# Patient Record
Sex: MALE | Race: BLACK OR AFRICAN AMERICAN | URBAN - METROPOLITAN AREA
[De-identification: names, ages, dates, MRNs, and addresses within clinical notes are randomized per-mention and may not be internally consistent; named-entity substitution may affect disease eponyms.]

---

## 2017-02-23 DIAGNOSIS — E66.09 NON MORBID OBESITY DUE TO EXCESS CALORIES: ICD-10-CM

## 2017-02-24 ENCOUNTER — TELEPHONE (OUTPATIENT)
Dept: INTERNAL MEDICINE CLINIC | Age: 42
End: 2017-02-24

## 2017-02-24 RX ORDER — PHENTERMINE HYDROCHLORIDE 37.5 MG/1
37.5 TABLET ORAL
Qty: 30 TAB | Refills: 0 | Status: SHIPPED | OUTPATIENT
Start: 2017-02-24 | End: 2017-02-28 | Stop reason: SDUPTHER

## 2017-02-28 DIAGNOSIS — E66.09 NON MORBID OBESITY DUE TO EXCESS CALORIES: ICD-10-CM

## 2017-02-28 RX ORDER — PHENTERMINE HYDROCHLORIDE 37.5 MG/1
37.5 TABLET ORAL
Qty: 30 TAB | Refills: 0 | Status: SHIPPED | OUTPATIENT
Start: 2017-02-28 | End: 2017-05-02 | Stop reason: SDUPTHER

## 2017-05-02 ENCOUNTER — TELEPHONE (OUTPATIENT)
Dept: INTERNAL MEDICINE CLINIC | Age: 42
End: 2017-05-02

## 2017-05-02 DIAGNOSIS — E66.09 NON MORBID OBESITY DUE TO EXCESS CALORIES: ICD-10-CM

## 2017-05-02 RX ORDER — PHENTERMINE HYDROCHLORIDE 37.5 MG/1
37.5 TABLET ORAL
Qty: 30 TAB | Refills: 0 | Status: SHIPPED | OUTPATIENT
Start: 2017-05-02 | End: 2017-06-20 | Stop reason: SDUPTHER

## 2017-05-02 NOTE — TELEPHONE ENCOUNTER
Requested Prescriptions     Pending Prescriptions Disp Refills    phentermine (ADIPEX-P) 37.5 mg tablet 30 Tab 0     Sig: Take 1 Tab by mouth every morning. Max Daily Amount: 37.5 mg.        Last office visit was  12/29/16  Next office visit is     6/29/17    Please assist.

## 2017-05-02 NOTE — TELEPHONE ENCOUNTER
Requested Prescriptions     Pending Prescriptions Disp Refills    phentermine (ADIPEX-P) 37.5 mg tablet 30 Tab 0     Sig: Take 1 Tab by mouth every morning.  Max Daily Amount: 37.5 mg.

## 2017-06-20 ENCOUNTER — TELEPHONE (OUTPATIENT)
Dept: INTERNAL MEDICINE CLINIC | Age: 42
End: 2017-06-20

## 2017-06-20 ENCOUNTER — HOSPITAL ENCOUNTER (OUTPATIENT)
Dept: LAB | Age: 42
Discharge: HOME | End: 2017-06-20
Payer: COMMERCIAL

## 2017-06-20 DIAGNOSIS — E66.09 NON MORBID OBESITY DUE TO EXCESS CALORIES: ICD-10-CM

## 2017-06-20 DIAGNOSIS — E78.00 HYPERCHOLESTEREMIA: ICD-10-CM

## 2017-06-20 LAB
ALBUMIN SERPL BCP-MCNC: 4.3 G/DL (ref 3.4–5)
ALBUMIN/GLOB SERPL: 1.3 {RATIO} (ref 0.8–1.7)
ALP SERPL-CCNC: 57 U/L (ref 45–117)
ALT SERPL-CCNC: 46 U/L (ref 16–61)
ANION GAP BLD CALC-SCNC: 10 MMOL/L (ref 3–18)
AST SERPL W P-5'-P-CCNC: 28 U/L (ref 15–37)
BILIRUB SERPL-MCNC: 0.2 MG/DL (ref 0.2–1)
BUN SERPL-MCNC: 20 MG/DL (ref 7–18)
BUN/CREAT SERPL: 21 (ref 12–20)
CALCIUM SERPL-MCNC: 8.9 MG/DL (ref 8.5–10.1)
CHLORIDE SERPL-SCNC: 103 MMOL/L (ref 100–108)
CHOLEST SERPL-MCNC: 182 MG/DL
CO2 SERPL-SCNC: 24 MMOL/L (ref 21–32)
CREAT SERPL-MCNC: 0.97 MG/DL (ref 0.6–1.3)
GLOBULIN SER CALC-MCNC: 3.2 G/DL (ref 2–4)
GLUCOSE SERPL-MCNC: 93 MG/DL (ref 74–99)
HDLC SERPL-MCNC: 62 MG/DL (ref 40–60)
HDLC SERPL: 2.9 {RATIO} (ref 0–5)
LDLC SERPL CALC-MCNC: 110.2 MG/DL (ref 0–100)
LIPID PROFILE,FLP: ABNORMAL
POTASSIUM SERPL-SCNC: 4.5 MMOL/L (ref 3.5–5.5)
PROT SERPL-MCNC: 7.5 G/DL (ref 6.4–8.2)
SODIUM SERPL-SCNC: 137 MMOL/L (ref 136–145)
TRIGL SERPL-MCNC: 49 MG/DL (ref ?–150)
VLDLC SERPL CALC-MCNC: 9.8 MG/DL

## 2017-06-20 PROCEDURE — 80053 COMPREHEN METABOLIC PANEL: CPT | Performed by: INTERNAL MEDICINE

## 2017-06-20 PROCEDURE — 36415 COLL VENOUS BLD VENIPUNCTURE: CPT | Performed by: INTERNAL MEDICINE

## 2017-06-20 PROCEDURE — 80061 LIPID PANEL: CPT | Performed by: INTERNAL MEDICINE

## 2017-06-20 RX ORDER — PHENTERMINE HYDROCHLORIDE 37.5 MG/1
37.5 TABLET ORAL
Qty: 30 TAB | Refills: 0 | Status: SHIPPED | OUTPATIENT
Start: 2017-06-20 | End: 2017-06-27 | Stop reason: SDUPTHER

## 2017-06-20 NOTE — TELEPHONE ENCOUNTER
Tried to leave message for patient to let him know his prescription was ready for  at the front office. Patient voice mail was full unable to leave a message.

## 2017-06-27 ENCOUNTER — OFFICE VISIT (OUTPATIENT)
Dept: INTERNAL MEDICINE CLINIC | Age: 42
End: 2017-06-27

## 2017-06-27 VITALS
RESPIRATION RATE: 16 BRPM | HEART RATE: 79 BPM | TEMPERATURE: 98 F | HEIGHT: 69 IN | BODY MASS INDEX: 33.27 KG/M2 | OXYGEN SATURATION: 97 % | SYSTOLIC BLOOD PRESSURE: 124 MMHG | WEIGHT: 224.6 LBS | DIASTOLIC BLOOD PRESSURE: 85 MMHG

## 2017-06-27 DIAGNOSIS — E78.00 HYPERCHOLESTEREMIA: Primary | ICD-10-CM

## 2017-06-27 DIAGNOSIS — E66.09 NON MORBID OBESITY DUE TO EXCESS CALORIES: ICD-10-CM

## 2017-06-27 RX ORDER — PHENTERMINE HYDROCHLORIDE 37.5 MG/1
37.5 TABLET ORAL
Qty: 30 TAB | Refills: 0 | Status: SHIPPED | OUTPATIENT
Start: 2017-06-27 | End: 2017-09-14 | Stop reason: SDUPTHER

## 2017-06-27 NOTE — PATIENT INSTRUCTIONS

## 2017-06-27 NOTE — MR AVS SNAPSHOT
Visit Information Date & Time Provider Department Dept. Phone Encounter #  
 6/27/2017  8:30 AM Radha Cisneros MD [facility] [employer] [id number] Follow-up Instructions Return in about 6 months (around 12/27/2017) for labs 1 week before. Upcoming Health Maintenance Date Due DTaP/Tdap/Td series (1 - Tdap) 10/18/1998 INFLUENZA AGE 9 TO ADULT 8/1/2017 Allergies as of 6/27/2017  Review Complete On: 6/27/2017 By: Radha Cisneros MD  
 No Known Allergies Current Immunizations  Never Reviewed No immunizations on file. Not reviewed this visit You Were Diagnosed With   
  
 Codes Comments Hypercholesteremia    -  Primary ICD-10-CM: E78.00 ICD-9-CM: 272.0 Non morbid obesity due to excess calories     ICD-10-CM: E66.09 
ICD-9-CM: 278.00 Vitals BP Pulse Temp Resp Height(growth percentile) Weight(growth percentile) 124/85 79 98 °F (36.7 °C) (Oral) 16 5' 9\" (1.753 m) 224 lb 9.6 oz (101.9 kg) SpO2 BMI Smoking Status 97% 33.17 kg/m2 Former Smoker Vitals History BMI and BSA Data Body Mass Index Body Surface Area  
 33.17 kg/m 2 2.23 m 2 Your Updated Medication List  
  
   
This list is accurate as of: 6/27/17  9:02 AM.  Always use your most recent med list.  
  
  
  
  
 phentermine 37.5 mg tablet Commonly known as:  ADIPEX-P Take 1 Tab by mouth every morning. Max Daily Amount: 37.5 mg.  
  
  
  
  
Prescriptions Printed Refills  
 phentermine (ADIPEX-P) 37.5 mg tablet 0 Sig: Take 1 Tab by mouth every morning. Max Daily Amount: 37.5 mg.  
 Class: Print Route: Oral  
  
Follow-up Instructions Return in about 6 months (around 12/27/2017) for labs 1 week before. Patient Instructions A Healthy Lifestyle: Care Instructions Your Care Instructions A healthy lifestyle can help you feel good, stay at a healthy weight, and have plenty of energy for both work and play. A healthy lifestyle is something you can share with your whole family. A healthy lifestyle also can lower your risk for serious health problems, such as high blood pressure, heart disease, and diabetes. You can follow a few steps listed below to improve your health and the health of your family. Follow-up care is a key part of your treatment and safety. Be sure to make and go to all appointments, and call your doctor if you are having problems. Its also a good idea to know your test results and keep a list of the medicines you take. How can you care for yourself at home? · Do not eat too much sugar, fat, or fast foods. You can still have dessert and treats now and then. The goal is moderation. · Start small to improve your eating habits. Pay attention to portion sizes, drink less juice and soda pop, and eat more fruits and vegetables. ¨ Eat a healthy amount of food. A 3-ounce serving of meat, for example, is about the size of a deck of cards. Fill the rest of your plate with vegetables and whole grains. ¨ Limit the amount of soda and sports drinks you have every day. Drink more water when you are thirsty. ¨ Eat at least 5 servings of fruits and vegetables every day. It may seem like a lot, but it is not hard to reach this goal. A serving or helping is 1 piece of fruit, 1 cup of vegetables, or 2 cups of leafy, raw vegetables. Have an apple or some carrot sticks as an afternoon snack instead of a candy bar. Try to have fruits and/or vegetables at every meal. 
· Make exercise part of your daily routine. You may want to start with simple activities, such as walking, bicycling, or slow swimming. Try to be active 30 to 60 minutes every day. You do not need to do all 30 to 60 minutes all at once. For example, you can exercise 3 times a day for 10 or 20 minutes.  Moderate exercise is safe for most people, but it is always a good idea to talk to your doctor before starting an exercise program. 
· Keep moving. Kiara Cook the lawn, work in the garden, or [employer] Take the stairs instead of the elevator at work. · If you smoke, quit. People who smoke have an increased risk for heart attack, stroke, cancer, and other lung illnesses. Quitting is hard, but there are ways to boost your chance of quitting tobacco for good. ¨ Use nicotine gum, patches, or lozenges. ¨ Ask your doctor about stop-smoking programs and medicines. ¨ Keep trying. In addition to reducing your risk of diseases in the future, you will notice some benefits soon after you stop using tobacco. If you have shortness of breath or asthma symptoms, they will likely get better within a few weeks after you quit. · Limit how much alcohol you drink. Moderate amounts of alcohol (up to 2 drinks a day for men, 1 drink a day for women) are okay. But drinking too much can lead to liver problems, high blood pressure, and other health problems. Family health If you have a family, there are many things you can do together to improve your health. · Eat meals together as a family as often as possible. · Eat healthy foods. This includes fruits, vegetables, lean meats and dairy, and whole grains. · Include your family in your fitness plan. Most people think of activities such as jogging or tennis as the way to fitness, but there are many ways you and your family can be more active. Anything that makes you breathe hard and gets your heart pumping is exercise. Here are some tips: 
¨ Walk to do errands or to take your child to school or the bus. ¨ Go for a family bike ride after dinner instead of watching TV. Where can you learn more? Go to http://jack-angle.info/. Enter N569 in the search box to learn more about \"A Healthy Lifestyle: Care Instructions. \" Current as of: July 26, 2016 Content Version: 11.3 © 0408-5151 [employer]. Care instructions adapted under license by [employer] (which disclaims liability or warranty for this information). If you have questions about a medical condition or this instruction, always ask your healthcare professional. [address] any warranty or liability for your use of this information. Introducing [facility]! [employer] introduces [facility] patient portal. Now you can access parts of your medical record, email your doctor's office, and request medication refills online. 1. In your internet browser, go to [email or web address] 2. Click on the First Time User? Click Here link in the Sign In box. You will see the New Member Sign Up page. 3. Enter your [facility] Access Code exactly as it appears below. You will not need to use this code after youve completed the sign-up process. If you do not sign up before the expiration date, you must request a new code. · [facility] Access Code: [email or web address] Expires: 9/25/2017  9:02 AM 
 
4. Enter the last four digits of your Social Security Number (xxxx) and Date of Birth (mm/dd/yyyy) as indicated and click Submit. You will be taken to the next sign-up page. 5. Create a [facility] ID. This will be your [facility] login ID and cannot be changed, so think of one that is secure and easy to remember. 6. Create a [facility] password. You can change your password at any time. 7. Enter your Password Reset Question and Answer. This can be used at a later time if you forget your password. 8. Enter your e-mail address. You will receive e-mail notification when new information is available in [address] 9. Click Sign Up. You can now view and download portions of your medical record. 10. Click the Download Summary menu link to download a portable copy of your medical information.  
 
If you have questions, please visit the Frequently Asked Questions section of the [employer]. Remember, [employer] is NOT to be used for urgent needs. For medical emergencies, dial 911. Now available from your iPhone and Android! Please provide this summary of care documentation to your next provider. Your primary care clinician is listed as STACI ANGEL. If you have any questions after today's visit, please call [phone].

## 2017-06-27 NOTE — PROGRESS NOTES
Pt is here for 6 month follow up. Cholesterol  Needs refill on phenterime      1. Have you been to the ER, urgent care clinic since your last visit? Hospitalized since your last visit? No    2. Have you seen or consulted any other health care providers outside of the [address] [place] [address] since your last visit? Include any pap smears or colon screening.  No

## 2017-06-27 NOTE — ACP (ADVANCE CARE PLANNING)
Pt given ACP paperwork today. Aware once completed to bring back so a copy can be scanned into his chart.

## 2017-06-27 NOTE — PROGRESS NOTES
Rogelio Kang is a 46 y.o.  male and presents with Follow-up and Cholesterol Problem      SUBJECTIVE:  Pt's HTN has resolved with diet and cutting back salt and weight loss. Cholesterol also has improved with diet. Pt's main risk factor is his BMI of 33 which he is trying to control with diet and exercise. He has used Adipex which has helped him with losing more weight in the past so he would like to try it again. He has severe sleep apnea and has been advised to f/u with neurology to get his CPAP machine. Respiratory ROS: negative for - shortness of breath  Cardiovascular ROS: negative for - chest pain    Current Outpatient Prescriptions   Medication Sig    phentermine (ADIPEX-P) 37.5 mg tablet Take 1 Tab by mouth every morning. Max Daily Amount: 37.5 mg. No current facility-administered medications for this visit. OBJECTIVE:  alert, well appearing, and in no distress  Visit Vitals    /85    Pulse 79    Temp 98 °F (36.7 °C) (Oral)    Resp 16    Ht 5' 9\" (1.753 m)    Wt 224 lb 9.6 oz (101.9 kg)    SpO2 97%    BMI 33.17 kg/m2      well developed and well nourished  Chest - clear to auscultation, no wheezes, rales or rhonchi, symmetric air entry  Heart - normal rate, regular rhythm, normal S1, S2, no murmurs, rubs, clicks or gallops    Labs:   Lab Results   Component Value Date/Time    Cholesterol, total 182 06/20/2017 08:25 AM    HDL Cholesterol 62 06/20/2017 08:25 AM    LDL, calculated 110.2 06/20/2017 08:25 AM    Triglyceride 49 06/20/2017 08:25 AM    CHOL/HDL Ratio 2.9 06/20/2017 08:25 AM     Lab Results   Component Value Date/Time    ALT (SGPT) 46 06/20/2017 08:25 AM    AST (SGOT) 28 06/20/2017 08:25 AM    Alk.  phosphatase 57 06/20/2017 08:25 AM    Bilirubin, total 0.2 06/20/2017 08:25 AM     Lab Results   Component Value Date/Time    GFR est AA >60 06/20/2017 08:25 AM    GFR est non-AA >60 06/20/2017 08:25 AM    Creatinine 0.97 06/20/2017 08:25 AM    BUN 20 06/20/2017 08:25 AM    Sodium 137 06/20/2017 08:25 AM    Potassium 4.5 06/20/2017 08:25 AM    Chloride 103 06/20/2017 08:25 AM    CO2 24 06/20/2017 08:25 AM      Lab Results   Component Value Date/Time    Glucose 93 06/20/2017 08:25 AM        Discussed the patient's BMI with him. The BMI follow up plan is as follows: BMI is out of normal parameters and plan is as follows: I have counseled this patient on diet and exercise regimens. Assessment/Plan      ICD-10-CM ICD-9-CM    1. Hypercholesteremia E78.00 272.0 Controlled with diet currently LIPID PANEL      METABOLIC PANEL, COMPREHENSIVE   2. Non morbid obesity due to excess calories E66.09 278.00 Will try phentermine (ADIPEX-P) 37.5 mg tablet along with diet and exercise          Follow-up Disposition:  Return in about 6 months (around 12/27/2017) for labs 1 week before. Reviewed plan of care. Patient has provided input and agrees with goals.

## 2017-09-14 DIAGNOSIS — E66.09 NON MORBID OBESITY DUE TO EXCESS CALORIES: ICD-10-CM

## 2017-09-14 RX ORDER — PHENTERMINE HYDROCHLORIDE 37.5 MG/1
37.5 TABLET ORAL
Qty: 30 TAB | Refills: 0 | Status: SHIPPED | OUTPATIENT
Start: 2017-09-14 | End: 2017-11-01 | Stop reason: SDUPTHER

## 2017-09-15 ENCOUNTER — TELEPHONE (OUTPATIENT)
Dept: INTERNAL MEDICINE CLINIC | Age: 42
End: 2017-09-15

## 2017-11-01 DIAGNOSIS — E66.09 NON MORBID OBESITY DUE TO EXCESS CALORIES: ICD-10-CM

## 2017-11-01 RX ORDER — PHENTERMINE HYDROCHLORIDE 37.5 MG/1
37.5 TABLET ORAL
Qty: 30 TAB | Refills: 0 | Status: SHIPPED | OUTPATIENT
Start: 2017-11-01 | End: 2018-01-02 | Stop reason: SDUPTHER

## 2017-11-01 RX ORDER — PHENTERMINE HYDROCHLORIDE 37.5 MG/1
37.5 TABLET ORAL
Qty: 30 TAB | Refills: 0 | Status: CANCELLED | OUTPATIENT
Start: 2017-11-01

## 2017-12-15 DIAGNOSIS — E66.09 NON MORBID OBESITY DUE TO EXCESS CALORIES: ICD-10-CM

## 2017-12-15 RX ORDER — PHENTERMINE HYDROCHLORIDE 37.5 MG/1
37.5 TABLET ORAL
Qty: 30 TAB | Refills: 0 | OUTPATIENT
Start: 2017-12-15

## 2017-12-15 NOTE — TELEPHONE ENCOUNTER
This patient has not been seen since he began weight loss regimen. Request to go to Dr. Madeline Che.

## 2017-12-26 ENCOUNTER — LAB ONLY (OUTPATIENT)
Dept: FAMILY MEDICINE CLINIC | Age: 42
End: 2017-12-26

## 2017-12-26 ENCOUNTER — HOSPITAL ENCOUNTER (OUTPATIENT)
Dept: LAB | Age: 42
Discharge: HOME | End: 2017-12-26
Payer: COMMERCIAL

## 2017-12-26 DIAGNOSIS — E78.00 HYPERCHOLESTEREMIA: Primary | ICD-10-CM

## 2017-12-26 DIAGNOSIS — E78.00 HYPERCHOLESTEREMIA: ICD-10-CM

## 2017-12-26 LAB
ALBUMIN SERPL-MCNC: 4 G/DL (ref 3.4–5)
ALBUMIN/GLOB SERPL: 1.2 {RATIO} (ref 0.8–1.7)
ALP SERPL-CCNC: 61 U/L (ref 45–117)
ALT SERPL-CCNC: 44 U/L (ref 16–61)
ANION GAP SERPL CALC-SCNC: 9 MMOL/L (ref 3–18)
AST SERPL-CCNC: 27 U/L (ref 15–37)
BILIRUB SERPL-MCNC: 0.4 MG/DL (ref 0.2–1)
BUN SERPL-MCNC: 15 MG/DL (ref 7–18)
BUN/CREAT SERPL: 14 (ref 12–20)
CALCIUM SERPL-MCNC: 8.5 MG/DL (ref 8.5–10.1)
CHLORIDE SERPL-SCNC: 104 MMOL/L (ref 100–108)
CHOLEST SERPL-MCNC: 197 MG/DL
CO2 SERPL-SCNC: 26 MMOL/L (ref 21–32)
CREAT SERPL-MCNC: 1.04 MG/DL (ref 0.6–1.3)
GLOBULIN SER CALC-MCNC: 3.3 G/DL (ref 2–4)
GLUCOSE SERPL-MCNC: 99 MG/DL (ref 74–99)
HDLC SERPL-MCNC: 58 MG/DL (ref 40–60)
HDLC SERPL: 3.4 {RATIO} (ref 0–5)
LDLC SERPL CALC-MCNC: 124.8 MG/DL (ref 0–100)
LIPID PROFILE,FLP: ABNORMAL
POTASSIUM SERPL-SCNC: 4.2 MMOL/L (ref 3.5–5.5)
PROT SERPL-MCNC: 7.3 G/DL (ref 6.4–8.2)
SODIUM SERPL-SCNC: 139 MMOL/L (ref 136–145)
TRIGL SERPL-MCNC: 71 MG/DL (ref ?–150)
VLDLC SERPL CALC-MCNC: 14.2 MG/DL

## 2017-12-26 PROCEDURE — 80053 COMPREHEN METABOLIC PANEL: CPT | Performed by: INTERNAL MEDICINE

## 2017-12-26 PROCEDURE — 80061 LIPID PANEL: CPT | Performed by: INTERNAL MEDICINE

## 2017-12-26 PROCEDURE — 36415 COLL VENOUS BLD VENIPUNCTURE: CPT | Performed by: INTERNAL MEDICINE

## 2018-01-02 ENCOUNTER — OFFICE VISIT (OUTPATIENT)
Dept: FAMILY MEDICINE CLINIC | Age: 43
End: 2018-01-02

## 2018-01-02 VITALS
DIASTOLIC BLOOD PRESSURE: 95 MMHG | BODY MASS INDEX: 33.86 KG/M2 | WEIGHT: 228.6 LBS | TEMPERATURE: 98.1 F | SYSTOLIC BLOOD PRESSURE: 150 MMHG | RESPIRATION RATE: 20 BRPM | HEART RATE: 95 BPM | OXYGEN SATURATION: 97 % | HEIGHT: 69 IN

## 2018-01-02 DIAGNOSIS — R03.0 ELEVATED BLOOD PRESSURE READING: ICD-10-CM

## 2018-01-02 DIAGNOSIS — Z12.5 SCREENING PSA (PROSTATE SPECIFIC ANTIGEN): ICD-10-CM

## 2018-01-02 DIAGNOSIS — E78.00 HYPERCHOLESTEREMIA: Primary | ICD-10-CM

## 2018-01-02 DIAGNOSIS — E66.09 NON MORBID OBESITY DUE TO EXCESS CALORIES: ICD-10-CM

## 2018-01-02 RX ORDER — PHENTERMINE HYDROCHLORIDE 37.5 MG/1
37.5 TABLET ORAL
Qty: 30 TAB | Refills: 0 | Status: SHIPPED | OUTPATIENT
Start: 2018-01-02 | End: 2018-07-03

## 2018-01-02 NOTE — MR AVS SNAPSHOT
Visit Information Date & Time Provider Department Dept. Phone Encounter #  
 1/2/2018  9:00 AM Lauro Allison, [address] [place], [place] [address]kamran Ge[facility]one [facility] [phone] [id number] Follow-up Instructions Return in about 6 months (around 7/2/2018) for labs 1 week before. Upcoming Health Maintenance Date Due DTaP/Tdap/Td series (1 - Tdap) 10/18/1998 Influenza Age 5 to Adult 8/1/2017 Allergies as of 1/2/2018  Review Complete On: 1/2/2018 By: Lauro Allison MD  
 No Known Allergies Current Immunizations  Reviewed on 1/2/2018 Name Date Influenza Vaccine 9/26/2017 Reviewed by Elena Mansfield LPN on 7/7/8620 at  8:63 AM  
You Were Diagnosed With   
  
 Codes Comments Elevated blood pressure reading    -  Primary ICD-10-CM: R03.0 ICD-9-CM: 796.2 Non morbid obesity due to excess calories     ICD-10-CM: E66.09 
ICD-9-CM: 278.00 Vitals BP Pulse Temp Resp Height(growth percentile) Weight(growth percentile) (!) 150/95 (BP 1 Location: Left arm, BP Patient Position: Sitting) 95 98.1 °F (36.7 °C) (Oral) 20 5' 9\" (1.753 m) 228 lb 9.6 oz (103.7 kg) SpO2 BMI Smoking Status 97% 33.76 kg/m2 Former Smoker Vitals History BMI and BSA Data Body Mass Index Body Surface Area 33.76 kg/m 2 2.25 m 2 Your Updated Medication List  
  
   
This list is accurate as of: 1/2/18  9:29 AM.  Always use your most recent med list.  
  
  
  
  
 AFLURIA QUAD [id number] (PF) Syrg injection Generic drug:  influenza vaccine 2017-18 (18 yrs+)(PF)  
inject 0.5 milliliter intramuscularly  
  
 phentermine 37.5 mg tablet Commonly known as:  ADIPEX-P Take 1 Tab by mouth every morning. Max Daily Amount: 37.5 mg.  
  
  
  
  
Prescriptions Printed Refills  
 phentermine (ADIPEX-P) 37.5 mg tablet 0 Sig: Take 1 Tab by mouth every morning. Max Daily Amount: 37.5 mg.  
 Class: Print Route: Oral  
  
Follow-up Instructions Return in about 6 months (around 7/2/2018) for labs 1 week before. Patient Instructions A Healthy Lifestyle: Care Instructions Your Care Instructions A healthy lifestyle can help you feel good, stay at a healthy weight, and have plenty of energy for both work and play. A healthy lifestyle is something you can share with your whole family. A healthy lifestyle also can lower your risk for serious health problems, such as high blood pressure, heart disease, and diabetes. You can follow a few steps listed below to improve your health and the health of your family. Follow-up care is a key part of your treatment and safety. Be sure to make and go to all appointments, and call your doctor if you are having problems. It's also a good idea to know your test results and keep a list of the medicines you take. How can you care for yourself at home? · Do not eat too much sugar, fat, or fast foods. You can still have dessert and treats now and then. The goal is moderation. · Start small to improve your eating habits. Pay attention to portion sizes, drink less juice and soda pop, and eat more fruits and vegetables. ¨ Eat a healthy amount of food. A 3-ounce serving of meat, for example, is about the size of a deck of cards. Fill the rest of your plate with vegetables and whole grains. ¨ Limit the amount of soda and sports drinks you have every day. Drink more water when you are thirsty. ¨ Eat at least 5 servings of fruits and vegetables every day. It may seem like a lot, but it is not hard to reach this goal. A serving or helping is 1 piece of fruit, 1 cup of vegetables, or 2 cups of leafy, raw vegetables. Have an apple or some carrot sticks as an afternoon snack instead of a candy bar. Try to have fruits and/or vegetables at every meal. 
· Make exercise part of your daily routine. You may want to start with simple activities, such as walking, bicycling, or slow swimming.  Try to be active 30 to 60 minutes every day. You do not need to do all 30 to 60 minutes all at once. For example, you can exercise 3 times a day for 10 or 20 minutes. Moderate exercise is safe for most people, but it is always a good idea to talk to your doctor before starting an exercise program. 
· Keep moving. [employer] Hoop the lawn, work in the garden, or [employer]. Take the stairs instead of the elevator at work. · If you smoke, quit. People who smoke have an increased risk for heart attack, stroke, cancer, and other lung illnesses. Quitting is hard, but there are ways to boost your chance of quitting tobacco for good. ¨ Use nicotine gum, patches, or lozenges. ¨ Ask your doctor about stop-smoking programs and medicines. ¨ Keep trying. In addition to reducing your risk of diseases in the future, you will notice some benefits soon after you stop using tobacco. If you have shortness of breath or asthma symptoms, they will likely get better within a few weeks after you quit. · Limit how much alcohol you drink. Moderate amounts of alcohol (up to 2 drinks a day for men, 1 drink a day for women) are okay. But drinking too much can lead to liver problems, high blood pressure, and other health problems. Family health If you have a family, there are many things you can do together to improve your health. · Eat meals together as a family as often as possible. · Eat healthy foods. This includes fruits, vegetables, lean meats and dairy, and whole grains. · Include your family in your fitness plan. Most people think of activities such as jogging or tennis as the way to fitness, but there are many ways you and your family can be more active. Anything that makes you breathe hard and gets your heart pumping is exercise. Here are some tips: 
¨ Walk to do errands or to take your child to school or the bus. ¨ Go for a family bike ride after dinner instead of watching TV. Where can you learn more? Go to http://jack-angle.info/. Enter X595 in the search box to learn more about \"A Healthy Lifestyle: Care Instructions. \" Current as of: May 12, 2017 Content Version: 11.4 © 8134-3140 [employer]. Care instructions adapted under license by [employer] (which disclaims liability or warranty for this information). If you have questions about a medical condition or this instruction, always ask your healthcare professional. [address] any warranty or liability for your use of this information. High Blood Pressure: Care Instructions Your Care Instructions If your blood pressure is usually above 140/90, you have high blood pressure, or hypertension. That means the top number is 140 or higher or the bottom number is 90 or higher, or both. Despite what a lot of people think, high blood pressure usually doesn't cause headaches or make you feel dizzy or lightheaded. It usually has no symptoms. But it does increase your risk for heart attack, stroke, and kidney or eye damage. The higher your blood pressure, the more your risk increases. Your doctor will give you a goal for your blood pressure. Your goal will be based on your health and your age. An example of a goal is to keep your blood pressure below 140/90. Lifestyle changes, such as eating healthy and being active, are always important to help lower blood pressure. You might also take medicine to reach your blood pressure goal. 
Follow-up care is a key part of your treatment and safety. Be sure to make and go to all appointments, and call your doctor if you are having problems. It's also a good idea to know your test results and keep a list of the medicines you take. How can you care for yourself at home? Medical treatment · If you stop taking your medicine, your blood pressure will go back up. You may take one or more types of medicine to lower your blood pressure. Be safe with medicines. Take your medicine exactly as prescribed. Call your doctor if you think you are having a problem with your medicine. · Talk to your doctor before you start taking aspirin every day. Aspirin can help certain people lower their risk of a heart attack or stroke. But taking aspirin isn't right for everyone, because it can cause serious bleeding. · See your doctor regularly. You may need to see the doctor more often at first or until your blood pressure comes down. · If you are taking blood pressure medicine, talk to your doctor before you take decongestants or anti-inflammatory medicine, such as ibuprofen. Some of these medicines can raise blood pressure. · Learn how to check your blood pressure at home. Lifestyle changes · Stay at a healthy weight. This is especially important if you put on weight around the waist. Losing even 10 pounds can help you lower your blood pressure. · If your doctor recommends it, get more exercise. Walking is a good choice. Bit by bit, increase the amount you walk every day. Try for at least 30 minutes on most days of the week. You also may want to swim, bike, or do other activities. · Avoid or limit alcohol. Talk to your doctor about whether you can drink any alcohol. · Try to limit how much sodium you eat to less than 2,300 milligrams (mg) a day. Your doctor may ask you to try to eat less than 1,500 mg a day. · Eat plenty of fruits (such as bananas and oranges), vegetables, legumes, whole grains, and low-fat dairy products. · Lower the amount of saturated fat in your diet. Saturated fat is found in animal products such as milk, cheese, and meat. Limiting these foods may help you lose weight and also lower your risk for heart disease. · Do not smoke. Smoking increases your risk for heart attack and stroke. If you need help quitting, talk to your doctor about stop-smoking programs and medicines. These can increase your chances of quitting for good. When should you call for help? Call 911 anytime you think you may need emergency care. This may mean having symptoms that suggest that your blood pressure is causing a serious heart or blood vessel problem. Your blood pressure may be over 180/110. ? For example, call 911 if: 
? · You have symptoms of a heart attack. These may include: ¨ Chest pain or pressure, or a strange feeling in the chest. 
¨ Sweating. ¨ Shortness of breath. ¨ Nausea or vomiting. ¨ Pain, pressure, or a strange feeling in the back, neck, jaw, or upper belly or in one or both shoulders or arms. ¨ Lightheadedness or sudden weakness. ¨ A fast or irregular heartbeat. ? · You have symptoms of a stroke. These may include: 
¨ Sudden numbness, tingling, weakness, or loss of movement in your face, arm, or leg, especially on only one side of your body. ¨ Sudden vision changes. ¨ Sudden trouble speaking. ¨ Sudden confusion or trouble understanding simple statements. ¨ Sudden problems with walking or balance. ¨ A sudden, severe headache that is different from past headaches. ? · You have severe back or belly pain. ?Do not wait until your blood pressure comes down on its own. Get help right away. ?Call your doctor now or seek immediate care if: 
? · Your blood pressure is much higher than normal (such as 180/110 or higher), but you don't have symptoms. ? · You think high blood pressure is causing symptoms, such as: ¨ Severe headache. ¨ Blurry vision. ? Watch closely for changes in your health, and be sure to contact your doctor if: 
? · Your blood pressure measures 140/90 or higher at least 2 times. That means the top number is 140 or higher or the bottom number is 90 or higher, or both. ? · You think you may be having side effects from your blood pressure medicine. ? · Your blood pressure is usually normal, but it goes above normal at least 2 times. Where can you learn more? Go to http://jack-angle.info/. Enter F187 in the search box to learn more about \"High Blood Pressure: Care Instructions. \" Current as of: September 21, 2016 Content Version: 11.4 © 1355-0860 [employer]wise, [employer]. Care instructions adapted under license by [employer] (which disclaims liability or warranty for this information). If you have questions about a medical condition or this instruction, always ask your healthcare professional. [address] any warranty or liability for your use of this information. Introducing [facility]! Nieves Mckee introduces [employer] patient portal. Now you can access parts of your medical record, email your doctor's office, and request medication refills online. 1. In your internet browser, go to [email or web address] 2. Click on the First Time User? Click Here link in the Sign In box. You will see the New Member Sign Up page. 3. Enter your [employer] Access Code exactly as it appears below. You will not need to use this code after youve completed the sign-up process. If you do not sign up before the expiration date, you must request a new code. · [employer] Access Code: [email or web address] Expires: 4/2/2018  8:54 AM 
 
4. Enter the last four digits of your Social Security Number (xxxx) and Date of Birth (mm/dd/yyyy) as indicated and click Submit. You will be taken to the next sign-up page. 5. Create a [employer] ID. This will be your [employer] login ID and cannot be changed, so think of one that is secure and easy to remember. 6. Create a [employer] password. You can change your password at any time. 7. Enter your Password Reset Question and Answer. This can be used at a later time if you forget your password. 8. Enter your e-mail address. You will receive e-mail notification when new information is available in [address] 9. Click Sign Up.  You can now view and download portions of your medical record. 10. Click the Download Summary menu link to download a portable copy of your medical information. If you have questions, please visit the Frequently Asked Questions section of the [employer] website. Remember, [employer] is NOT to be used for urgent needs. For medical emergencies, dial 911. Now available from your iPhone and Android! Please provide this summary of care documentation to your next provider. Your primary care clinician is listed as STACI ANGEL. If you have any questions after today's visit, please call [phone].

## 2018-01-02 NOTE — PROGRESS NOTES
Radha Marmolejo is a 38 y.o.  male and presents with Cholesterol Problem; Weight Loss (f/u); Hypertension; and Hip Pain (right )      SUBJECTIVE:  Pt's HTN has resolved with high salt diet and weight gain. Pt will again work on diet and exercise to try and lose weight. Pt will try to get Cholesterol under better control with diet and exercise. Pt's main risk factor is his BMI of 33 which he is trying to control with diet and exercise. He has used Adipex which has helped him with losing more weight in the past so he would like to try it again. He has severe sleep apnea and has been advised to f/u with neurology to get his CPAP machine. Hip Pain  Patient complains of right hip pain. Onset of the symptoms was 2 weeks ago. Inciting event: none. Current symptoms include right hip pain. Severity = mild, tolerable. Associated symptoms: none. Aggravating symptoms: standing, walking. Patient's overall course: symptoms have progressed to a point and plateaued. . Patient has had no prior hip problems. Previous visits for this problem: none. Evaluation to date: none  Treatment to date: none. Respiratory ROS: negative for - shortness of breath  Cardiovascular ROS: negative for - chest pain    Current Outpatient Prescriptions   Medication Sig    phentermine (ADIPEX-P) 37.5 mg tablet Take 1 Tab by mouth every morning. Max Daily Amount: 37.5 mg.    AFLURIA QUAD [id number], PF, syrg injection inject 0.5 milliliter intramuscularly     No current facility-administered medications for this visit.           OBJECTIVE:  alert, well appearing, and in no distress  Visit Vitals    BP (!) 150/95 (BP 1 Location: Left arm, BP Patient Position: Sitting)    Pulse 95    Temp 98.1 °F (36.7 °C) (Oral)    Resp 20    Ht 5' 9\" (1.753 m)    Wt 228 lb 9.6 oz (103.7 kg)    SpO2 97%    BMI 33.76 kg/m2      well developed and well nourished  Chest - clear to auscultation, no wheezes, rales or rhonchi, symmetric air entry  Heart - normal rate, regular rhythm, normal S1, S2, no murmurs, rubs, clicks or gallops  Back exam - full range of motion, no tenderness, palpable spasm or pain on motion  Musculoskeletal - right hip with good internal and external rotation. No tenderness of the trochanter bursa. Labs:   Lab Results   Component Value Date/Time    Cholesterol, total 197 12/26/2017 07:35 AM    HDL Cholesterol 58 12/26/2017 07:35 AM    LDL, calculated 124.8 12/26/2017 07:35 AM    Triglyceride 71 12/26/2017 07:35 AM    CHOL/HDL Ratio 3.4 12/26/2017 07:35 AM     Lab Results   Component Value Date/Time    ALT (SGPT) 44 12/26/2017 07:35 AM    AST (SGOT) 27 12/26/2017 07:35 AM    Alk. phosphatase 61 12/26/2017 07:35 AM    Bilirubin, total 0.4 12/26/2017 07:35 AM     Lab Results   Component Value Date/Time    GFR est AA >60 12/26/2017 07:35 AM    GFR est non-AA >60 12/26/2017 07:35 AM    Creatinine 1.04 12/26/2017 07:35 AM    BUN 15 12/26/2017 07:35 AM    Sodium 139 12/26/2017 07:35 AM    Potassium 4.2 12/26/2017 07:35 AM    Chloride 104 12/26/2017 07:35 AM    CO2 26 12/26/2017 07:35 AM      Lab Results   Component Value Date/Time    Glucose 99 12/26/2017 07:35 AM        Discussed the patient's BMI with him. The BMI follow up plan is as follows: BMI is out of normal parameters and plan is as follows: I have counseled this patient on diet and exercise regimens. Assessment/Plan      ICD-10-CM ICD-9-CM    1. Hypercholesteremia E78.00 272.0 Will work on it with diet and exercise. LIPID PANEL      METABOLIC PANEL, COMPREHENSIVE   2. Non morbid obesity due to excess calories E66.09 278.00 Will try phentermine (ADIPEX-P) 37.5 mg tablet along with diet and exercise. Pt to monitor BP weekly while taking this medication    3. Elevated blood pressure reading R03.0 796.2 Pt to monitor BP weekly until it improves. He will cut back salt and try to lose weight.     4. Screening PSA (prostate specific antigen) Z12.5 V76.44 PSA, DIAGNOSTIC (PROSTATE SPECIFIC AG)         Follow-up Disposition:  Return in about 6 months (around 7/2/2018) for labs 1 week before. Reviewed plan of care. Patient has provided input and agrees with goals. Discussed the patient's BMI with him. The BMI follow up plan is as follows:     dietary management education, guidance, and counseling  encourage exercise  monitor weight  prescribed dietary intake    An After Visit Summary was printed and given to the patient.

## 2018-01-02 NOTE — PROGRESS NOTES
Patient is in the office today for a 6 month follow up. Patient states he does have some right hip pain 4/10. 1. Have you been to the ER, urgent care clinic since your last visit? Hospitalized since your last visit? No    2. Have you seen or consulted any other health care providers outside of the [address] [place] [address] since your last visit? Include any pap smears or colon screening.  No

## 2018-01-02 NOTE — PATIENT INSTRUCTIONS
A Healthy Lifestyle: Care Instructions  Your Care Instructions    A healthy lifestyle can help you feel good, stay at a healthy weight, and have plenty of energy for both work and play. A healthy lifestyle is something you can share with your whole family. A healthy lifestyle also can lower your risk for serious health problems, such as high blood pressure, heart disease, and diabetes. You can follow a few steps listed below to improve your health and the health of your family. Follow-up care is a key part of your treatment and safety. Be sure to make and go to all appointments, and call your doctor if you are having problems. It's also a good idea to know your test results and keep a list of the medicines you take. How can you care for yourself at home? · Do not eat too much sugar, fat, or fast foods. You can still have dessert and treats now and then. The goal is moderation. · Start small to improve your eating habits. Pay attention to portion sizes, drink less juice and soda pop, and eat more fruits and vegetables. ¨ Eat a healthy amount of food. A 3-ounce serving of meat, for example, is about the size of a deck of cards. Fill the rest of your plate with vegetables and whole grains. ¨ Limit the amount of soda and sports drinks you have every day. Drink more water when you are thirsty. ¨ Eat at least 5 servings of fruits and vegetables every day. It may seem like a lot, but it is not hard to reach this goal. A serving or helping is 1 piece of fruit, 1 cup of vegetables, or 2 cups of leafy, raw vegetables. Have an apple or some carrot sticks as an afternoon snack instead of a candy bar. Try to have fruits and/or vegetables at every meal.  · Make exercise part of your daily routine. You may want to start with simple activities, such as walking, bicycling, or slow swimming. Try to be active 30 to 60 minutes every day. You do not need to do all 30 to 60 minutes all at once.  For example, you can exercise 3 times a day for 10 or 20 minutes. Moderate exercise is safe for most people, but it is always a good idea to talk to your doctor before starting an exercise program.  · Keep moving. Anam Binghamws the lawn, work in the garden, or [place]. Take the stairs instead of the elevator at work. · If you smoke, quit. People who smoke have an increased risk for heart attack, stroke, cancer, and other lung illnesses. Quitting is hard, but there are ways to boost your chance of quitting tobacco for good. ¨ Use nicotine gum, patches, or lozenges. ¨ Ask your doctor about stop-smoking programs and medicines. ¨ Keep trying. In addition to reducing your risk of diseases in the future, you will notice some benefits soon after you stop using tobacco. If you have shortness of breath or asthma symptoms, they will likely get better within a few weeks after you quit. · Limit how much alcohol you drink. Moderate amounts of alcohol (up to 2 drinks a day for men, 1 drink a day for women) are okay. But drinking too much can lead to liver problems, high blood pressure, and other health problems. Family health  If you have a family, there are many things you can do together to improve your health. · Eat meals together as a family as often as possible. · Eat healthy foods. This includes fruits, vegetables, lean meats and dairy, and whole grains. · Include your family in your fitness plan. Most people think of activities such as jogging or tennis as the way to fitness, but there are many ways you and your family can be more active. Anything that makes you breathe hard and gets your heart pumping is exercise. Here are some tips:  ¨ Walk to do errands or to take your child to school or the bus. ¨ Go for a family bike ride after dinner instead of watching TV. Where can you learn more? Go to http://jack-angle.info/. Enter P580 in the search box to learn more about \"A Healthy Lifestyle: Care Instructions. \"  Current as of: May 12, 2017  Content Version: 11.4  © 3492-6437 [employer]. Care instructions adapted under license by [employer] (which disclaims liability or warranty for this information). If you have questions about a medical condition or this instruction, always ask your healthcare professional. [address] any warranty or liability for your use of this information. High Blood Pressure: Care Instructions  Your Care Instructions    If your blood pressure is usually above 140/90, you have high blood pressure, or hypertension. That means the top number is 140 or higher or the bottom number is 90 or higher, or both. Despite what a lot of people think, high blood pressure usually doesn't cause headaches or make you feel dizzy or lightheaded. It usually has no symptoms. But it does increase your risk for heart attack, stroke, and kidney or eye damage. The higher your blood pressure, the more your risk increases. Your doctor will give you a goal for your blood pressure. Your goal will be based on your health and your age. An example of a goal is to keep your blood pressure below 140/90. Lifestyle changes, such as eating healthy and being active, are always important to help lower blood pressure. You might also take medicine to reach your blood pressure goal.  Follow-up care is a key part of your treatment and safety. Be sure to make and go to all appointments, and call your doctor if you are having problems. It's also a good idea to know your test results and keep a list of the medicines you take. How can you care for yourself at home? Medical treatment  · If you stop taking your medicine, your blood pressure will go back up. You may take one or more types of medicine to lower your blood pressure. Be safe with medicines. Take your medicine exactly as prescribed. Call your doctor if you think you are having a problem with your medicine.   · Talk to your doctor before you start taking aspirin every day. Aspirin can help certain people lower their risk of a heart attack or stroke. But taking aspirin isn't right for everyone, because it can cause serious bleeding. · See your doctor regularly. You may need to see the doctor more often at first or until your blood pressure comes down. · If you are taking blood pressure medicine, talk to your doctor before you take decongestants or anti-inflammatory medicine, such as ibuprofen. Some of these medicines can raise blood pressure. · Learn how to check your blood pressure at home. Lifestyle changes  · Stay at a healthy weight. This is especially important if you put on weight around the waist. Losing even 10 pounds can help you lower your blood pressure. · If your doctor recommends it, get more exercise. Walking is a good choice. Bit by bit, increase the amount you walk every day. Try for at least 30 minutes on most days of the week. You also may want to swim, bike, or do other activities. · Avoid or limit alcohol. Talk to your doctor about whether you can drink any alcohol. · Try to limit how much sodium you eat to less than 2,300 milligrams (mg) a day. Your doctor may ask you to try to eat less than 1,500 mg a day. · Eat plenty of fruits (such as bananas and oranges), vegetables, legumes, whole grains, and low-fat dairy products. · Lower the amount of saturated fat in your diet. Saturated fat is found in animal products such as milk, cheese, and meat. Limiting these foods may help you lose weight and also lower your risk for heart disease. · Do not smoke. Smoking increases your risk for heart attack and stroke. If you need help quitting, talk to your doctor about stop-smoking programs and medicines. These can increase your chances of quitting for good. When should you call for help? Call 911 anytime you think you may need emergency care.  This may mean having symptoms that suggest that your blood pressure is causing a serious heart or blood vessel problem. Your blood pressure may be over 180/110. ? For example, call 911 if:  ? · You have symptoms of a heart attack. These may include:  ¨ Chest pain or pressure, or a strange feeling in the chest.  ¨ Sweating. ¨ Shortness of breath. ¨ Nausea or vomiting. ¨ Pain, pressure, or a strange feeling in the back, neck, jaw, or upper belly or in one or both shoulders or arms. ¨ Lightheadedness or sudden weakness. ¨ A fast or irregular heartbeat. ? · You have symptoms of a stroke. These may include:  ¨ Sudden numbness, tingling, weakness, or loss of movement in your face, arm, or leg, especially on only one side of your body. ¨ Sudden vision changes. ¨ Sudden trouble speaking. ¨ Sudden confusion or trouble understanding simple statements. ¨ Sudden problems with walking or balance. ¨ A sudden, severe headache that is different from past headaches. ? · You have severe back or belly pain. ?Do not wait until your blood pressure comes down on its own. Get help right away. ?Call your doctor now or seek immediate care if:  ? · Your blood pressure is much higher than normal (such as 180/110 or higher), but you don't have symptoms. ? · You think high blood pressure is causing symptoms, such as:  ¨ Severe headache. ¨ Blurry vision. ? Watch closely for changes in your health, and be sure to contact your doctor if:  ? · Your blood pressure measures 140/90 or higher at least 2 times. That means the top number is 140 or higher or the bottom number is 90 or higher, or both. ? · You think you may be having side effects from your blood pressure medicine. ? · Your blood pressure is usually normal, but it goes above normal at least 2 times. Where can you learn more? Go to http://jack-angle.info/. Enter O076 in the search box to learn more about \"High Blood Pressure: Care Instructions. \"  Current as of: September 21, 2016  Content Version: 11.4  © 7945-5863 [employer]. Care instructions adapted under license by [employer] (which disclaims liability or warranty for this information). If you have questions about a medical condition or this instruction, always ask your healthcare professional. [address] any warranty or liability for your use of this information. Body Mass Index: Care Instructions  Your Care Instructions    Body mass index (BMI) can help you see if your weight is raising your risk for health problems. It uses a formula to compare how much you weigh with how tall you are. · A BMI lower than 18.5 is considered underweight. · A BMI between 18.5 and 24.9 is considered healthy. · A BMI between 25 and 29.9 is considered overweight. A BMI of 30 or higher is considered obese. If your BMI is in the normal range, it means that you have a lower risk for weight-related health problems. If your BMI is in the overweight or obese range, you may be at increased risk for weight-related health problems, such as high blood pressure, heart disease, stroke, arthritis or joint pain, and diabetes. If your BMI is in the underweight range, you may be at increased risk for health problems such as fatigue, lower protection (immunity) against illness, muscle loss, bone loss, hair loss, and hormone problems. BMI is just one measure of your risk for weight-related health problems. You may be at higher risk for health problems if you are not active, you eat an unhealthy diet, or you drink too much alcohol or use tobacco products. Follow-up care is a key part of your treatment and safety. Be sure to make and go to all appointments, and call your doctor if you are having problems. It's also a good idea to know your test results and keep a list of the medicines you take. How can you care for yourself at home? · Practice healthy eating habits.  This includes eating plenty of fruits, vegetables, whole grains, lean protein, and low-fat dairy.  · If your doctor recommends it, get more exercise. Walking is a good choice. Bit by bit, increase the amount you walk every day. Try for at least 30 minutes on most days of the week. · Do not smoke. Smoking can increase your risk for health problems. If you need help quitting, talk to your doctor about stop-smoking programs and medicines. These can increase your chances of quitting for good. · Limit alcohol to 2 drinks a day for men and 1 drink a day for women. Too much alcohol can cause health problems. If you have a BMI higher than 25  · Your doctor may do other tests to check your risk for weight-related health problems. This may include measuring the distance around your waist. A waist measurement of more than 40 inches in men or 35 inches in women can increase the risk of weight-related health problems. · Talk with your doctor about steps you can take to stay healthy or improve your health. You may need to make lifestyle changes to lose weight and stay healthy, such as changing your diet and getting regular exercise. If you have a BMI lower than 18.5  · Your doctor may do other tests to check your risk for health problems. · Talk with your doctor about steps you can take to stay healthy or improve your health. You may need to make lifestyle changes to gain or maintain weight and stay healthy, such as getting more healthy foods in your diet and doing exercises to build muscle. Where can you learn more? Go to http://jack-angle.info/. Enter S176 in the search box to learn more about \"Body Mass Index: Care Instructions. \"  Current as of: October 13, 2016  Content Version: 11.4  © 6236-0601 [employer]. Care instructions adapted under license by [employer] (which disclaims liability or warranty for this information).  If you have questions about a medical condition or this instruction, always ask your healthcare professional. Shilpi Miller disclaims any warranty or liability for your use of this information.

## 2018-06-28 LAB
ALB/GLOBRATIO, 58C: 1.8 (CALC) (ref 1–2.5)
ALBUMIN SERPL-MCNC: 4.5 G/DL (ref 3.6–5.1)
ALP SERPL-CCNC: 54 U/L (ref 40–115)
ALT SERPL-CCNC: 31 U/L (ref 9–46)
AST SERPL W P-5'-P-CCNC: 22 U/L (ref 10–40)
BILIRUB SERPL-MCNC: 0.3 MG/DL (ref 0.2–1.2)
BUN SERPL-MCNC: 15 MG/DL (ref 7–25)
BUN/CREATININE RATIO,BUCR: ABNORMAL (CALC) (ref 6–22)
CALCIUM SERPL-MCNC: 9.4 MG/DL (ref 8.6–10.3)
CHLORIDE SERPL-SCNC: 107 MMOL/L (ref 98–110)
CHOL/HDL RATIO,CHHDX: 3.4 (CALC)
CHOLEST SERPL-MCNC: 183 MG/DL
CO2 SERPL-SCNC: 26 MMOL/L (ref 20–31)
CREAT SERPL-MCNC: 1.05 MG/DL (ref 0.6–1.35)
GLOBULIN,GLOB: 2.5 G/DL (CALC) (ref 1.9–3.7)
GLUCOSE SERPL-MCNC: 106 MG/DL (ref 65–99)
HDLC SERPL-MCNC: 54 MG/DL
LDL-CHOLESTEROL: 112 MG/DL (CALC)
NON-HDL CHOLESTEROL, 011976: 129 MG/DL (CALC)
POTASSIUM SERPL-SCNC: 4.5 MMOL/L (ref 3.5–5.3)
PROT SERPL-MCNC: 7 G/DL (ref 6.1–8.1)
PSA TOTAL,4108: 1.2 NG/ML
SODIUM SERPL-SCNC: 141 MMOL/L (ref 135–146)
TRIGL SERPL-MCNC: 76 MG/DL (ref ?–150)

## 2018-07-03 ENCOUNTER — OFFICE VISIT (OUTPATIENT)
Dept: FAMILY MEDICINE CLINIC | Age: 43
End: 2018-07-03

## 2018-07-03 VITALS
HEIGHT: 69 IN | HEART RATE: 87 BPM | WEIGHT: 233 LBS | OXYGEN SATURATION: 98 % | SYSTOLIC BLOOD PRESSURE: 140 MMHG | TEMPERATURE: 99 F | BODY MASS INDEX: 34.51 KG/M2 | DIASTOLIC BLOOD PRESSURE: 98 MMHG | RESPIRATION RATE: 20 BRPM

## 2018-07-03 DIAGNOSIS — I10 ESSENTIAL HYPERTENSION: Primary | ICD-10-CM

## 2018-07-03 DIAGNOSIS — E78.00 HYPERCHOLESTEREMIA: ICD-10-CM

## 2018-07-03 DIAGNOSIS — E66.09 CLASS 1 OBESITY DUE TO EXCESS CALORIES WITH SERIOUS COMORBIDITY AND BODY MASS INDEX (BMI) OF 34.0 TO 34.9 IN ADULT: ICD-10-CM

## 2018-07-03 NOTE — PROGRESS NOTES
Subjective:       Chief Complaint  The patient presents for follow up of hypertension and high cholesterol. Obesity         NIYAH Brown is a 38 y.o. male seen for follow up of hyperlipidemia. Healso has hypertension. Hypertension borderline controlled, pt has not been able to control it with diet and weight loss. , he also uses a lot of salt in his diet. , hyperlipidemia borderline controlled, pt will try and improve by losing 10 lbs . Diet and Lifestyle: not attempting to follow a low fat, low cholesterol diet, exercises sporadically    Home BP Monitoring: is not well controlled at home,     Other symptoms and concerns: pt is trying to control is FBS with diet and weight loss. He will try high protein low calorie diet to try an lose weight. Current Outpatient Prescriptions   Medication Sig    aspirin/acetaminophen/caffeine (EXCEDRIN MIGRAINE PO) Take  by mouth.  AFLURIA QUAD [id number], PF, syrg injection inject 0.5 milliliter intramuscularly     No current facility-administered medications for this visit.               Review of Systems  Respiratory: negative for dyspnea on exertion  Cardiovascular: negative for chest pain    Objective:     Visit Vitals    BP (!) 140/98 (BP 1 Location: Left arm, BP Patient Position: Sitting)    Pulse 87    Temp 99 °F (37.2 °C) (Oral)    Resp 20    Ht 5' 9\" (1.753 m)    Wt 233 lb (105.7 kg)    SpO2 98%    BMI 34.41 kg/m2        General appearance - alert, well appearing, and in no distress      Labs:   Lab Results   Component Value Date/Time    Cholesterol, total 183 06/27/2018 12:00 AM    HDL Cholesterol 54 06/27/2018 12:00 AM    LDL-CHOLESTEROL 112 (H) 06/27/2018 12:00 AM    LDL, calculated 124.8 (H) 12/26/2017 07:35 AM    Triglyceride 76 06/27/2018 12:00 AM    CHOL/HDL Ratio 3.4 12/26/2017 07:35 AM    Cholesterol/HDL ratio 3.4 06/27/2018 12:00 AM     No results found for: PSA, Rubin Manifold, [place], [id number], [id number], PSALT  Lab Results   Component Value Date/Time    Sodium 141 06/27/2018 12:00 AM    Potassium 4.5 06/27/2018 12:00 AM    Chloride 107 06/27/2018 12:00 AM    CO2 26 06/27/2018 12:00 AM    Anion gap 9 12/26/2017 07:35 AM    Glucose 106 (H) 06/27/2018 12:00 AM    BUN 15 06/27/2018 12:00 AM    Creatinine 1.05 06/27/2018 12:00 AM    BUN/Creatinine ratio NOT APPLICABLE 80/64/5027 72:45 AM    GFR est  06/27/2018 12:00 AM    GFR est non-AA 88 06/27/2018 12:00 AM    Calcium 9.4 06/27/2018 12:00 AM    Bilirubin, total 0.3 06/27/2018 12:00 AM    ALT (SGPT) 31 06/27/2018 12:00 AM    AST (SGOT) 22 06/27/2018 12:00 AM    Alk. phosphatase 54 06/27/2018 12:00 AM    Protein, total 7.0 06/27/2018 12:00 AM    Albumin 4.5 06/27/2018 12:00 AM    Globulin 2.5 06/27/2018 12:00 AM    A-G Ratio 1.2 12/26/2017 07:35 AM            Assessment / Plan     Hypertension poorly controlled, it unable to lose 10 lbs and improve will need to consider BP meds   Hyperlipidemia borderline controlled, will try to improve with diet and weight loss. .      ICD-10-CM ICD-9-CM    1. Essential hypertension X16 708.3 METABOLIC PANEL, COMPREHENSIVE   2. Hypercholesteremia E78.00 272.0 LIPID PANEL   3. Class 1 obesity due to excess calories with serious comorbidity and body mass index (BMI) of 34.0 to 34.9 in adult E66.09 278.00 Will continue to work on diet and exercise to try and lose weight. Z68.34 V85.34              Low cholesterol diet, weight control and daily exercise discussed. Follow-up Disposition:  Return in about 6 months (around 1/3/2019) for labs 1 week before. Reviewed plan of care. Patient has provided input and agrees with goals.

## 2018-07-03 NOTE — PATIENT INSTRUCTIONS
Advance Directives: Care Instructions  Your Care Instructions  An advance directive is a legal way to state your wishes at the end of your life. It tells your family and your doctor what to do if you can no longer say what you want. There are two main types of advance directives. You can change them any time that your wishes change. · A living will tells your family and your doctor your wishes about life support and other treatment. · A durable power of  for health care lets you name a person to make treatment decisions for you when you can't speak for yourself. This person is called a health care agent. If you do not have an advance directive, decisions about your medical care may be made by a doctor or a  who doesn't know you. It may help to think of an advance directive as a gift to the people who care for you. If you have one, they won't have to make tough decisions by themselves. Follow-up care is a key part of your treatment and safety. Be sure to make and go to all appointments, and call your doctor if you are having problems. It's also a good idea to know your test results and keep a list of the medicines you take. How can you care for yourself at home? · Discuss your wishes with your loved ones and your doctor. This way, there are no surprises. · Many states have a unique form. Or you might use a universal form that has been approved by many states. This kind of form can sometimes be completed and stored online. Your electronic copy will then be available wherever you have a connection to the Internet. In most cases, doctors will respect your wishes even if you have a form from a different state. · You don't need a  to do an advance directive. But you may want to get legal advice. · Think about these questions when you prepare an advance directive:  ¨ Who do you want to make decisions about your medical care if you are not able to?  Many people choose a family member or close friend. ¨ Do you know enough about life support methods that might be used? If not, talk to your doctor so you understand. ¨ What are you most afraid of that might happen? You might be afraid of having pain, losing your independence, or being kept alive by machines. ¨ Where would you prefer to die? Choices include your home, a hospital, or a nursing home. ¨ Would you like to have information about hospice care to support you and your family? ¨ Do you want to donate organs when you die? ¨ Do you want certain Christianity practices performed before you die? If so, put your wishes in the advance directive. · Read your advance directive every year, and make changes as needed. When should you call for help? Be sure to contact your doctor if you have any questions. Where can you learn more? Go to http://jack-angle.info/. Enter R264 in the search box to learn more about \"Advance Directives: Care Instructions. \"  Current as of: September 24, 2016  Content Version: 11.4  © 3534-2897 [employer]. Care instructions adapted under license by [employer] (which disclaims liability or warranty for this information). If you have questions about a medical condition or this instruction, always ask your healthcare professional. [place] [address] any warranty or liability for your use of this information. High Blood Pressure: Care Instructions  Your Care Instructions    If your blood pressure is usually above 140/90, you have high blood pressure, or hypertension. That means the top number is 140 or higher or the bottom number is 90 or higher, or both. Despite what a lot of people think, high blood pressure usually doesn't cause headaches or make you feel dizzy or lightheaded. It usually has no symptoms. But it does increase your risk for heart attack, stroke, and kidney or eye damage. The higher your blood pressure, the more your risk increases.   Your doctor will give you a goal for your blood pressure. Your goal will be based on your health and your age. An example of a goal is to keep your blood pressure below 140/90. Lifestyle changes, such as eating healthy and being active, are always important to help lower blood pressure. You might also take medicine to reach your blood pressure goal.  Follow-up care is a key part of your treatment and safety. Be sure to make and go to all appointments, and call your doctor if you are having problems. It's also a good idea to know your test results and keep a list of the medicines you take. How can you care for yourself at home? Medical treatment  · If you stop taking your medicine, your blood pressure will go back up. You may take one or more types of medicine to lower your blood pressure. Be safe with medicines. Take your medicine exactly as prescribed. Call your doctor if you think you are having a problem with your medicine. · Talk to your doctor before you start taking aspirin every day. Aspirin can help certain people lower their risk of a heart attack or stroke. But taking aspirin isn't right for everyone, because it can cause serious bleeding. · See your doctor regularly. You may need to see the doctor more often at first or until your blood pressure comes down. · If you are taking blood pressure medicine, talk to your doctor before you take decongestants or anti-inflammatory medicine, such as ibuprofen. Some of these medicines can raise blood pressure. · Learn how to check your blood pressure at home. Lifestyle changes  · Stay at a healthy weight. This is especially important if you put on weight around the waist. Losing even 10 pounds can help you lower your blood pressure. · If your doctor recommends it, get more exercise. Walking is a good choice. Bit by bit, increase the amount you walk every day. Try for at least 30 minutes on most days of the week.  You also may want to swim, bike, or do other activities. · Avoid or limit alcohol. Talk to your doctor about whether you can drink any alcohol. · Try to limit how much sodium you eat to less than 2,300 milligrams (mg) a day. Your doctor may ask you to try to eat less than 1,500 mg a day. · Eat plenty of fruits (such as bananas and oranges), vegetables, legumes, whole grains, and low-fat dairy products. · Lower the amount of saturated fat in your diet. Saturated fat is found in animal products such as milk, cheese, and meat. Limiting these foods may help you lose weight and also lower your risk for heart disease. · Do not smoke. Smoking increases your risk for heart attack and stroke. If you need help quitting, talk to your doctor about stop-smoking programs and medicines. These can increase your chances of quitting for good. When should you call for help? Call 911 anytime you think you may need emergency care. This may mean having symptoms that suggest that your blood pressure is causing a serious heart or blood vessel problem. Your blood pressure may be over 180/110. ? For example, call 911 if:  ? · You have symptoms of a heart attack. These may include:  ¨ Chest pain or pressure, or a strange feeling in the chest.  ¨ Sweating. ¨ Shortness of breath. ¨ Nausea or vomiting. ¨ Pain, pressure, or a strange feeling in the back, neck, jaw, or upper belly or in one or both shoulders or arms. ¨ Lightheadedness or sudden weakness. ¨ A fast or irregular heartbeat. ? · You have symptoms of a stroke. These may include:  ¨ Sudden numbness, tingling, weakness, or loss of movement in your face, arm, or leg, especially on only one side of your body. ¨ Sudden vision changes. ¨ Sudden trouble speaking. ¨ Sudden confusion or trouble understanding simple statements. ¨ Sudden problems with walking or balance. ¨ A sudden, severe headache that is different from past headaches. ? · You have severe back or belly pain.    ?Do not wait until your blood pressure comes down on its own. Get help right away. ?Call your doctor now or seek immediate care if:  ? · Your blood pressure is much higher than normal (such as 180/110 or higher), but you don't have symptoms. ? · You think high blood pressure is causing symptoms, such as:  ¨ Severe headache. ¨ Blurry vision. ? Watch closely for changes in your health, and be sure to contact your doctor if:  ? · Your blood pressure measures 140/90 or higher at least 2 times. That means the top number is 140 or higher or the bottom number is 90 or higher, or both. ? · You think you may be having side effects from your blood pressure medicine. ? · Your blood pressure is usually normal, but it goes above normal at least 2 times. Where can you learn more? Go to http://jack-angle.info/. Enter K159 in the search box to learn more about \"High Blood Pressure: Care Instructions. \"  Current as of: September 21, 2016  Content Version: 11.4  © 6201-7066 [employer]. Care instructions adapted under license by [employer] (which disclaims liability or warranty for this information). If you have questions about a medical condition or this instruction, always ask your healthcare professional. [place] [address] any warranty or liability for your use of this information.

## 2018-07-03 NOTE — PROGRESS NOTES
Patient is in the office today for a 6 month follow up. Patient states he is not taking any medication at this time. 1. Have you been to the ER, urgent care clinic since your last visit? Hospitalized since your last visit? No    2. Have you seen or consulted any other health care providers outside of the [facility] since your last visit? Include any pap smears or colon screening.  No

## 2018-07-03 NOTE — MR AVS SNAPSHOT
[address] [place] [address] [facility] [address] 
[phone] Patient: Pawan Lopez MRN: [id number] :1975 Visit Information Date & Time Provider Department Dept. Phone Encounter #  
 7/3/2018  9:15 AM Javi Tilley, [address] [place] [address] [phone] [id number] Follow-up Instructions Return in about 6 months (around 1/3/2019) for labs 1 week before. Upcoming Health Maintenance Date Due DTaP/Tdap/Td series (1 - Tdap) 10/18/1998 Influenza Age 5 to Adult 2018 Allergies as of 7/3/2018  Review Complete On: 7/3/2018 By: Javi Tilley MD  
 No Known Allergies Current Immunizations  Reviewed on 2018 Name Date Influenza Vaccine 2017 Not reviewed this visit You Were Diagnosed With   
  
 Codes Comments Essential hypertension    -  Primary ICD-10-CM: I10 
ICD-9-CM: 401.9 Class 1 obesity due to excess calories with serious comorbidity and body mass index (BMI) of 34.0 to 34.9 in adult     ICD-10-CM: E66.09, Z68.34 
ICD-9-CM: 278.00, V85.34 Vitals BP Pulse Temp Resp Height(growth percentile) Weight(growth percentile) (!) 140/98 (BP 1 Location: Left arm, BP Patient Position: Sitting) 87 99 °F (37.2 °C) (Oral) 20 5' 9\" (1.753 m) 233 lb (105.7 kg) SpO2 BMI Smoking Status 98% 34.41 kg/m2 Former Smoker Vitals History BMI and BSA Data Body Mass Index Body Surface Area 34.41 kg/m 2 2.27 m 2 Your Updated Medication List  
  
   
This list is accurate as of 7/3/18 10:01 AM.  Always use your most recent med list.  
  
  
  
  
 AFLURIA QUAD [id number] (PF) Syrg injection Generic drug:  influenza vaccine  (18 yrs+)(PF)  
inject 0.5 milliliter intramuscularly EXCEDRIN MIGRAINE PO Take  by mouth.  
  
 phentermine 37.5 mg tablet Commonly known as:  ADIPEX-P Take 1 Tab by mouth every morning.  Max Daily Amount: 37.5 mg.  
  
 Follow-up Instructions Return in about 6 months (around 1/3/2019) for labs 1 week before. Patient Instructions Advance Directives: Care Instructions Your Care Instructions An advance directive is a legal way to state your wishes at the end of your life. It tells your family and your doctor what to do if you can no longer say what you want. There are two main types of advance directives. You can change them any time that your wishes change. · A living will tells your family and your doctor your wishes about life support and other treatment. · A durable power of  for health care lets you name a person to make treatment decisions for you when you can't speak for yourself. This person is called a health care agent. If you do not have an advance directive, decisions about your medical care may be made by a doctor or a  who doesn't know you. It may help to think of an advance directive as a gift to the people who care for you. If you have one, they won't have to make tough decisions by themselves. Follow-up care is a key part of your treatment and safety. Be sure to make and go to all appointments, and call your doctor if you are having problems. It's also a good idea to know your test results and keep a list of the medicines you take. How can you care for yourself at home? · Discuss your wishes with your loved ones and your doctor. This way, there are no surprises. · Many states have a unique form. Or you might use a universal form that has been approved by many states. This kind of form can sometimes be completed and stored online. Your electronic copy will then be available wherever you have a connection to the Internet. In most cases, doctors will respect your wishes even if you have a form from a different state. · You don't need a  to do an advance directive. But you may want to get legal advice. · Think about these questions when you prepare an advance directive: ¨ Who do you want to make decisions about your medical care if you are not able to? Many people choose a family member or close friend. ¨ Do you know enough about life support methods that might be used? If not, talk to your doctor so you understand. ¨ What are you most afraid of that might happen? You might be afraid of having pain, losing your independence, or being kept alive by machines. ¨ Where would you prefer to die? Choices include your home, a hospital, or a nursing home. ¨ Would you like to have information about hospice care to support you and your family? ¨ Do you want to donate organs when you die? ¨ Do you want certain Episcopal practices performed before you die? If so, put your wishes in the advance directive. · Read your advance directive every year, and make changes as needed. When should you call for help? Be sure to contact your doctor if you have any questions. Where can you learn more? Go to http://jack-angle.info/. Enter R264 in the search box to learn more about \"Advance Directives: Care Instructions. \" Current as of: September 24, 2016 Content Version: 11.4 © 2616-6205 "[employer]. Care instructions adapted under license by [employer] (which disclaims liability or warranty for this information). If you have questions about a medical condition or this instruction, always ask your healthcare professional. [place] [address] any warranty or liability for your use of this information. High Blood Pressure: Care Instructions Your Care Instructions If your blood pressure is usually above 140/90, you have high blood pressure, or hypertension. That means the top number is 140 or higher or the bottom number is 90 or higher, or both.  
Despite what a lot of people think, high blood pressure usually doesn't cause headaches or make you feel dizzy or lightheaded. It usually has no symptoms. But it does increase your risk for heart attack, stroke, and kidney or eye damage. The higher your blood pressure, the more your risk increases. Your doctor will give you a goal for your blood pressure. Your goal will be based on your health and your age. An example of a goal is to keep your blood pressure below 140/90. Lifestyle changes, such as eating healthy and being active, are always important to help lower blood pressure. You might also take medicine to reach your blood pressure goal. 
Follow-up care is a key part of your treatment and safety. Be sure to make and go to all appointments, and call your doctor if you are having problems. It's also a good idea to know your test results and keep a list of the medicines you take. How can you care for yourself at home? Medical treatment · If you stop taking your medicine, your blood pressure will go back up. You may take one or more types of medicine to lower your blood pressure. Be safe with medicines. Take your medicine exactly as prescribed. Call your doctor if you think you are having a problem with your medicine. · Talk to your doctor before you start taking aspirin every day. Aspirin can help certain people lower their risk of a heart attack or stroke. But taking aspirin isn't right for everyone, because it can cause serious bleeding. · See your doctor regularly. You may need to see the doctor more often at first or until your blood pressure comes down. · If you are taking blood pressure medicine, talk to your doctor before you take decongestants or anti-inflammatory medicine, such as ibuprofen. Some of these medicines can raise blood pressure. · Learn how to check your blood pressure at home. Lifestyle changes · Stay at a healthy weight. This is especially important if you put on weight around the waist. Losing even 10 pounds can help you lower your blood pressure. · If your doctor recommends it, get more exercise. Walking is a good choice. Bit by bit, increase the amount you walk every day. Try for at least 30 minutes on most days of the week. You also may want to swim, bike, or do other activities. · Avoid or limit alcohol. Talk to your doctor about whether you can drink any alcohol. · Try to limit how much sodium you eat to less than 2,300 milligrams (mg) a day. Your doctor may ask you to try to eat less than 1,500 mg a day. · Eat plenty of fruits (such as bananas and oranges), vegetables, legumes, whole grains, and low-fat dairy products. · Lower the amount of saturated fat in your diet. Saturated fat is found in animal products such as milk, cheese, and meat. Limiting these foods may help you lose weight and also lower your risk for heart disease. · Do not smoke. Smoking increases your risk for heart attack and stroke. If you need help quitting, talk to your doctor about stop-smoking programs and medicines. These can increase your chances of quitting for good. When should you call for help? Call 911 anytime you think you may need emergency care. This may mean having symptoms that suggest that your blood pressure is causing a serious heart or blood vessel problem. Your blood pressure may be over 180/110. ? For example, call 911 if: 
? · You have symptoms of a heart attack. These may include: ¨ Chest pain or pressure, or a strange feeling in the chest. 
¨ Sweating. ¨ Shortness of breath. ¨ Nausea or vomiting. ¨ Pain, pressure, or a strange feeling in the back, neck, jaw, or upper belly or in one or both shoulders or arms. ¨ Lightheadedness or sudden weakness. ¨ A fast or irregular heartbeat. ? · You have symptoms of a stroke. These may include: 
¨ Sudden numbness, tingling, weakness, or loss of movement in your face, arm, or leg, especially on only one side of your body. ¨ Sudden vision changes. ¨ Sudden trouble speaking. ¨ Sudden confusion or trouble understanding simple statements. ¨ Sudden problems with walking or balance. ¨ A sudden, severe headache that is different from past headaches. ? · You have severe back or belly pain. ?Do not wait until your blood pressure comes down on its own. Get help right away. ?Call your doctor now or seek immediate care if: 
? · Your blood pressure is much higher than normal (such as 180/110 or higher), but you don't have symptoms. ? · You think high blood pressure is causing symptoms, such as: ¨ Severe headache. ¨ Blurry vision. ? Watch closely for changes in your health, and be sure to contact your doctor if: 
? · Your blood pressure measures 140/90 or higher at least 2 times. That means the top number is 140 or higher or the bottom number is 90 or higher, or both. ? · You think you may be having side effects from your blood pressure medicine. ? · Your blood pressure is usually normal, but it goes above normal at least 2 times. Where can you learn more? Go to http://jack-angle.info/. Enter X074 in the search box to learn more about \"High Blood Pressure: Care Instructions. \" Current as of: September 21, 2016 Content Version: 11.4 © 0383-1615 [employer]. Care instructions adapted under license by [employer] (which disclaims liability or warranty for this information). If you have questions about a medical condition or this instruction, always ask your healthcare professional. [place] [address] any warranty or liability for your use of this information. Introducing [address]! [employer] introduces [employer] patient portal. Now you can access parts of your medical record, email your doctor's office, and request medication refills online. 1. In your internet browser, go to [email or web address] 2. Click on the First Time User? Click Here link in the Sign In box.  You will see the New Member Sign Up page. 3. Enter your Sift Access Code exactly as it appears below. You will not need to use this code after youve completed the sign-up process. If you do not sign up before the expiration date, you must request a new code. · Sift Access Code: [email or web address] Expires: 10/1/2018 10:01 AM 
 
4. Enter the last four digits of your Social Security Number (xxxx) and Date of Birth (mm/dd/yyyy) as indicated and click Submit. You will be taken to the next sign-up page. 5. Create a Sift ID. This will be your Sift login ID and cannot be changed, so think of one that is secure and easy to remember. 6. Create a Sift password. You can change your password at any time. 7. Enter your Password Reset Question and Answer. This can be used at a later time if you forget your password. 8. Enter your e-mail address. You will receive e-mail notification when new information is available in [address]. 9. Click Sign Up. You can now view and download portions of your medical record. 10. Click the Download Summary menu link to download a portable copy of your medical information. If you have questions, please visit the Frequently Asked Questions section of the Sift website. Remember, Sift is NOT to be used for urgent needs. For medical emergencies, dial 911. Now available from your iPhone and Android! Please provide this summary of care documentation to your next provider. Your primary care clinician is listed as STACI ANGEL. If you have any questions after today's visit, please call [phone].

## 2018-12-28 LAB — PSA TOTAL,4108: 1.2 NG/ML

## 2019-01-02 ENCOUNTER — OFFICE VISIT (OUTPATIENT)
Dept: FAMILY MEDICINE CLINIC | Age: 44
End: 2019-01-02

## 2019-01-02 VITALS
HEIGHT: 69 IN | TEMPERATURE: 98.5 F | SYSTOLIC BLOOD PRESSURE: 139 MMHG | OXYGEN SATURATION: 97 % | BODY MASS INDEX: 34.66 KG/M2 | RESPIRATION RATE: 20 BRPM | WEIGHT: 234 LBS | HEART RATE: 82 BPM | DIASTOLIC BLOOD PRESSURE: 89 MMHG

## 2019-01-02 DIAGNOSIS — R73.9 HIGH BLOOD SUGAR: ICD-10-CM

## 2019-01-02 DIAGNOSIS — E78.00 HYPERCHOLESTEREMIA: Primary | ICD-10-CM

## 2019-01-02 DIAGNOSIS — R03.0 ELEVATED BLOOD PRESSURE READING: ICD-10-CM

## 2019-01-02 NOTE — PATIENT INSTRUCTIONS
A Healthy Lifestyle: Care Instructions  Your Care Instructions    A healthy lifestyle can help you feel good, stay at a healthy weight, and have plenty of energy for both work and play. A healthy lifestyle is something you can share with your whole family. A healthy lifestyle also can lower your risk for serious health problems, such as high blood pressure, heart disease, and diabetes. You can follow a few steps listed below to improve your health and the health of your family. Follow-up care is a key part of your treatment and safety. Be sure to make and go to all appointments, and call your doctor if you are having problems. It's also a good idea to know your test results and keep a list of the medicines you take. How can you care for yourself at home? · Do not eat too much sugar, fat, or fast foods. You can still have dessert and treats now and then. The goal is moderation. · Start small to improve your eating habits. Pay attention to portion sizes, drink less juice and soda pop, and eat more fruits and vegetables. ? Eat a healthy amount of food. A 3-ounce serving of meat, for example, is about the size of a deck of cards. Fill the rest of your plate with vegetables and whole grains. ? Limit the amount of soda and sports drinks you have every day. Drink more water when you are thirsty. ? Eat at least 5 servings of fruits and vegetables every day. It may seem like a lot, but it is not hard to reach this goal. A serving or helping is 1 piece of fruit, 1 cup of vegetables, or 2 cups of leafy, raw vegetables. Have an apple or some carrot sticks as an afternoon snack instead of a candy bar. Try to have fruits and/or vegetables at every meal.  · Make exercise part of your daily routine. You may want to start with simple activities, such as walking, bicycling, or slow swimming. Try to be active 30 to 60 minutes every day. You do not need to do all 30 to 60 minutes all at once.  For example, you can exercise 3 times a day for 10 or 20 minutes. Moderate exercise is safe for most people, but it is always a good idea to talk to your doctor before starting an exercise program.  · Keep moving. [employer] the lawn, work in the garden, or [email or web address]. Take the stairs instead of the elevator at work. · If you smoke, quit. People who smoke have an increased risk for heart attack, stroke, cancer, and other lung illnesses. Quitting is hard, but there are ways to boost your chance of quitting tobacco for good. ? Use nicotine gum, patches, or lozenges. ? Ask your doctor about stop-smoking programs and medicines. ? Keep trying. In addition to reducing your risk of diseases in the future, you will notice some benefits soon after you stop using tobacco. If you have shortness of breath or asthma symptoms, they will likely get better within a few weeks after you quit. · Limit how much alcohol you drink. Moderate amounts of alcohol (up to 2 drinks a day for men, 1 drink a day for women) are okay. But drinking too much can lead to liver problems, high blood pressure, and other health problems. Family health  If you have a family, there are many things you can do together to improve your health. · Eat meals together as a family as often as possible. · Eat healthy foods. This includes fruits, vegetables, lean meats and dairy, and whole grains. · Include your family in your fitness plan. Most people think of activities such as jogging or tennis as the way to fitness, but there are many ways you and your family can be more active. Anything that makes you breathe hard and gets your heart pumping is exercise. Here are some tips:  ? Walk to do errands or to take your child to school or the bus.  ? Go for a family bike ride after dinner instead of watching TV. Where can you learn more? Go to http://jack-angle.info/. Enter G439 in the search box to learn more about \"A Healthy Lifestyle: Care Instructions. \"  Current as of: December 7, 2017  Content Version: 11.8  © 8984-4805 [employer], Incorporated. Care instructions adapted under license by [employer] (which disclaims liability or warranty for this information). If you have questions about a medical condition or this instruction, always ask your healthcare professional. [address] any warranty or liability for your use of this information.

## 2019-01-02 NOTE — PROGRESS NOTES
Patient is in the office today for 6 month follow up. 1. Have you been to the ER, urgent care clinic since your last visit? Hospitalized since your last visit? No    2. Have you seen or consulted any other health care providers outside of the [address] [place] [address] since your last visit? Include any pap smears or colon screening.  No

## 2019-01-03 NOTE — PROGRESS NOTES
Subjective:       Chief Complaint  The patient presents for follow up of hypertension and high cholesterol. Obesity         HPI  Rip Guillory is a 41 y.o. male seen for follow up of hyperlipidemia. Healso has hypertension. Hypertension borderline controlled, pt has been trying to improve it with diet and weight loss and cutting back salt in his diet. , hyperlipidemia borderline controlled, pt will try and improve by losing 10 lbs . Diet and Lifestyle: not attempting to follow a low fat, low cholesterol diet, exercises sporadically    Home BP Monitoring: is not well controlled at home,     Other symptoms and concerns: pt is trying to control is high FBS with diet and weight loss. He will try high protein low calorie diet to try and lose weight. We will check hemoglobin A1c at next office visit        Current Outpatient Medications   Medication Sig    aspirin/acetaminophen/caffeine (EXCEDRIN MIGRAINE PO) Take  by mouth.  AFLURIA QUAD [id number], PF, syrg injection inject 0.5 milliliter intramuscularly     No current facility-administered medications for this visit.               Review of Systems  Respiratory: negative for dyspnea on exertion  Cardiovascular: negative for chest pain    Objective:     Visit Vitals  /89 (BP 1 Location: Left arm, BP Patient Position: Sitting)   Pulse 82   Temp 98.5 °F (36.9 °C) (Oral)   Resp 20   Ht 5' 9\" (1.753 m)   Wt 234 lb (106.1 kg)   SpO2 97%   BMI 34.56 kg/m²        General appearance - alert, well appearing, and in no distress  Chest - clear to auscultation, no wheezes, rales or rhonchi, symmetric air entry  Heart - normal rate, regular rhythm, normal S1, S2, no murmurs, rubs, clicks or gallops  Extremities - peripheral pulses normal, no pedal edema, no clubbing or cyanosis        Labs:   Lab Results   Component Value Date/Time    Cholesterol, total 212 (H) 12/27/2018 08:25 AM    HDL Cholesterol 58 12/27/2018 08:25 AM    LDL-CHOLESTEROL 136 (H) 12/27/2018 08:25 AM    LDL, calculated 124.8 (H) 12/26/2017 07:35 AM    Triglyceride 83 12/27/2018 08:25 AM    CHOL/HDL Ratio 3.4 12/26/2017 07:35 AM    Cholesterol/HDL ratio 3.7 12/27/2018 08:25 AM       Lab Results   Component Value Date/Time    Sodium 139 12/27/2018 08:25 AM    Potassium 4.6 12/27/2018 08:25 AM    Chloride 104 12/27/2018 08:25 AM    CO2 25 12/27/2018 08:25 AM    Anion gap 9 12/26/2017 07:35 AM    Glucose 110 (H) 12/27/2018 08:25 AM    BUN 19 12/27/2018 08:25 AM    Creatinine 1.03 12/27/2018 08:25 AM    BUN/Creatinine ratio NOT APPLICABLE 47/44/1754 80:43 AM    GFR est  12/27/2018 08:25 AM    GFR est non-AA 90 12/27/2018 08:25 AM    Calcium 9.5 12/27/2018 08:25 AM    Bilirubin, total 0.3 12/27/2018 08:25 AM    ALT (SGPT) 39 12/27/2018 08:25 AM    AST (SGOT) 24 12/27/2018 08:25 AM    Alk. phosphatase 57 12/27/2018 08:25 AM    Protein, total 7.1 12/27/2018 08:25 AM    Albumin 4.5 12/27/2018 08:25 AM    Globulin 2.6 12/27/2018 08:25 AM    A-G Ratio 1.2 12/26/2017 07:35 AM            Assessment / Plan     Hypertension borderline controlled, will try and improve further with diet and weight loss  Hyperlipidemia borderline controlled, will try to improve with diet and weight loss. .      ICD-10-CM ICD-9-CM    1. Hypercholesteremia E78.00 272.0 LIPID PANEL      METABOLIC PANEL, COMPREHENSIVE   2. Elevated blood pressure reading R03.0 796.2    3. High blood sugar R73.9 790.29  patient will try and work on this by losing 10-20 pounds. Will check hEMOGLOBIN A1C W/O EAG. At next office visit. Low cholesterol diet, weight control and daily exercise discussed. Follow-up Disposition:  Return in about 6 months (around 7/2/2019) for labs 1 week before. Reviewed plan of care. Patient has provided input and agrees with goals.

## 2019-06-27 LAB
ALB/GLOBRATIO, 58C: 1.9 (CALC) (ref 1–2.5)
ALBUMIN SERPL-MCNC: 4.9 G/DL (ref 3.6–5.1)
ALP SERPL-CCNC: 58 U/L (ref 40–115)
ALT SERPL-CCNC: 22 U/L (ref 9–46)
AST SERPL W P-5'-P-CCNC: 22 U/L (ref 10–40)
BILIRUB SERPL-MCNC: 0.5 MG/DL (ref 0.2–1.2)
BUN SERPL-MCNC: 20 MG/DL (ref 7–25)
BUN/CREATININE RATIO,BUCR: ABNORMAL (CALC) (ref 6–22)
CALCIUM SERPL-MCNC: 9.9 MG/DL (ref 8.6–10.3)
CHLORIDE SERPL-SCNC: 106 MMOL/L (ref 98–110)
CHOL/HDL RATIO,CHHDX: 3 (CALC)
CHOLEST SERPL-MCNC: 190 MG/DL
CO2 SERPL-SCNC: 24 MMOL/L (ref 20–32)
CREAT SERPL-MCNC: 1.19 MG/DL (ref 0.6–1.35)
EAG (MG/DL),9916804: 126 (CALC)
EAG (MMOL/L),9916805: 7 (CALC)
GLOBULIN,GLOB: 2.6 G/DL (CALC) (ref 1.9–3.7)
GLUCOSE SERPL-MCNC: 104 MG/DL (ref 65–99)
HBA1C MFR BLD HPLC: 6 % OF TOTAL HGB
HDLC SERPL-MCNC: 64 MG/DL
LDL-CHOLESTEROL: 113 MG/DL (CALC)
NON-HDL CHOLESTEROL, 011976: 126 MG/DL (CALC)
POTASSIUM SERPL-SCNC: 4.5 MMOL/L (ref 3.5–5.3)
PROT SERPL-MCNC: 7.5 G/DL (ref 6.1–8.1)
SODIUM SERPL-SCNC: 141 MMOL/L (ref 135–146)
TRIGL SERPL-MCNC: 51 MG/DL (ref ?–150)

## 2019-07-02 ENCOUNTER — OFFICE VISIT (OUTPATIENT)
Dept: FAMILY MEDICINE CLINIC | Age: 44
End: 2019-07-02

## 2019-07-02 VITALS
HEIGHT: 69 IN | TEMPERATURE: 98 F | DIASTOLIC BLOOD PRESSURE: 84 MMHG | WEIGHT: 233 LBS | BODY MASS INDEX: 34.51 KG/M2 | OXYGEN SATURATION: 97 % | RESPIRATION RATE: 18 BRPM | SYSTOLIC BLOOD PRESSURE: 137 MMHG | HEART RATE: 73 BPM

## 2019-07-02 DIAGNOSIS — Z12.5 PROSTATE CANCER SCREENING: ICD-10-CM

## 2019-07-02 DIAGNOSIS — R73.03 PREDIABETES: ICD-10-CM

## 2019-07-02 DIAGNOSIS — E78.00 HYPERCHOLESTEREMIA: Primary | ICD-10-CM

## 2019-07-02 DIAGNOSIS — E66.09 CLASS 1 OBESITY DUE TO EXCESS CALORIES WITH SERIOUS COMORBIDITY AND BODY MASS INDEX (BMI) OF 34.0 TO 34.9 IN ADULT: ICD-10-CM

## 2019-07-02 RX ORDER — BISMUTH SUBSALICYLATE 262 MG
1 TABLET,CHEWABLE ORAL DAILY
COMMUNITY

## 2019-07-02 NOTE — PROGRESS NOTES
Subjective:       Chief Complaint  The patient presents for follow up of hypertension and high cholesterol. Obesity         HPI  Julio Morse is a 41 y.o. male seen for follow up of hyperlipidemia. Healso has hypertension. Hypertension borderline controlled, pt has been trying to improve it with diet and weight loss and cutting back salt in his diet. , hyperlipidemia borderline controlled, pt will try and improve by losing 10 lbs . Pt also has prediabetes which he will try to improve with losing 10-20 lbs. Diet and Lifestyle: generally follows a low fat low cholesterol diet, does not rigorously follow a diabetic diet, exercises sporadically    Home BP Monitoring: is not well controlled at home,     Other symptoms and concerns: pt will continue to work on cutting back starches and calories to lose weight and decrease his BMI from 34        Current Outpatient Medications   Medication Sig    multivitamin (ONE A DAY) tablet Take 1 Tab by mouth daily.  aspirin/acetaminophen/caffeine (EXCEDRIN MIGRAINE PO) Take  by mouth.  AFLURIA QUAD [id number], PF, syrg injection inject 0.5 milliliter intramuscularly     No current facility-administered medications for this visit.               Review of Systems  Respiratory: negative for dyspnea on exertion  Cardiovascular: negative for chest pain    Objective:     Visit Vitals  /84 (BP 1 Location: Left arm, BP Patient Position: Sitting)   Pulse 73   Temp 98 °F (36.7 °C) (Oral)   Resp 18   Ht 5' 9\" (1.753 m)   Wt 233 lb (105.7 kg)   SpO2 97%   BMI 34.41 kg/m²        General appearance - alert, well appearing, and in no distress  Chest - clear to auscultation, no wheezes, rales or rhonchi, symmetric air entry  Heart - normal rate, regular rhythm, normal S1, S2, no murmurs, rubs, clicks or gallops  Extremities - peripheral pulses normal, no pedal edema, no clubbing or cyanosis        Labs:   Lab Results   Component Value Date/Time    Cholesterol, total 190 06/26/2019 10:46 AM    HDL Cholesterol 64 06/26/2019 10:46 AM    LDL-CHOLESTEROL 113 (H) 06/26/2019 10:46 AM    LDL, calculated 124.8 (H) 12/26/2017 07:35 AM    Triglyceride 51 06/26/2019 10:46 AM    CHOL/HDL Ratio 3.4 12/26/2017 07:35 AM    Cholesterol/HDL ratio 3.0 06/26/2019 10:46 AM       Lab Results   Component Value Date/Time    Sodium 141 06/26/2019 10:46 AM    Potassium 4.5 06/26/2019 10:46 AM    Chloride 106 06/26/2019 10:46 AM    CO2 24 06/26/2019 10:46 AM    Anion gap 9 12/26/2017 07:35 AM    Glucose 104 (H) 06/26/2019 10:46 AM    BUN 20 06/26/2019 10:46 AM    Creatinine 1.19 06/26/2019 10:46 AM    BUN/Creatinine ratio NOT APPLICABLE 89/56/9715 30:49 AM    GFR est AA 87 06/26/2019 10:46 AM    GFR est non-AA 75 06/26/2019 10:46 AM    Calcium 9.9 06/26/2019 10:46 AM    Bilirubin, total 0.5 06/26/2019 10:46 AM    ALT (SGPT) 22 06/26/2019 10:46 AM    AST (SGOT) 22 06/26/2019 10:46 AM    Alk. phosphatase 58 06/26/2019 10:46 AM    Protein, total 7.5 06/26/2019 10:46 AM    Albumin 4.9 06/26/2019 10:46 AM    Globulin 2.6 06/26/2019 10:46 AM    A-G Ratio 1.2 12/26/2017 07:35 AM              Assessment / Plan     Hypertension borderline controlled, will try and improve further with diet and weight loss  Hyperlipidemia borderline controlled, will try to improve with diet and weight loss. .      ICD-10-CM ICD-9-CM    1. Hypercholesteremia E78.00 272.0 LIPID PANEL      METABOLIC PANEL, COMPREHENSIVE   2. Prediabetes R73.03 790.29 Will try to improve by losing 10-20 lbs and cutting back starches HEMOGLOBIN A1C W/O EAG   3. Class 1 obesity due to excess calories with serious comorbidity and body mass index (BMI) of 34.0 to 34.9 in adult E66.09 278.00 Pt will work on cutting back calories to lose weight     Z68.34 V85.34    4. Prostate cancer screening Z12.5 V76.44 PSA, DIAGNOSTIC (PROSTATE SPECIFIC AG)               Low cholesterol diet, weight control and daily exercise discussed.      Follow-up and Dispositions · Return in about 6 months (around 1/2/2020) for labs 1 week before. Reviewed plan of care. Patient has provided input and agrees with goals.

## 2019-07-02 NOTE — PROGRESS NOTES
Patient is in the office today for a 6 month follow up. Do you have an Advance Directive no  Do you want more information :information given    1. Have you been to the ER, urgent care clinic since your last visit? Hospitalized since your last visit? No    2. Have you seen or consulted any other health care providers outside of the [address] [place] [address] since your last visit? Include any pap smears or colon screening.  No

## 2019-07-02 NOTE — PATIENT INSTRUCTIONS

## 2019-12-20 LAB
ALB/GLOBRATIO, 58C: 1.7 (CALC) (ref 1–2.5)
ALBUMIN SERPL-MCNC: 4.6 G/DL (ref 3.6–5.1)
ALP SERPL-CCNC: 55 U/L (ref 40–115)
ALT SERPL-CCNC: 33 U/L (ref 9–46)
AST SERPL W P-5'-P-CCNC: 26 U/L (ref 10–40)
BILIRUB SERPL-MCNC: 0.4 MG/DL (ref 0.2–1.2)
BUN SERPL-MCNC: 19 MG/DL (ref 7–25)
BUN/CREATININE RATIO,BUCR: ABNORMAL (CALC) (ref 6–22)
CALCIUM SERPL-MCNC: 9.7 MG/DL (ref 8.6–10.3)
CHLORIDE SERPL-SCNC: 104 MMOL/L (ref 98–110)
CHOL/HDL RATIO,CHHDX: 3.3 (CALC)
CHOLEST SERPL-MCNC: 211 MG/DL
CO2 SERPL-SCNC: 26 MMOL/L (ref 20–32)
CREAT SERPL-MCNC: 1.06 MG/DL (ref 0.6–1.35)
EAG (MG/DL),9916804: 128 (CALC)
EAG (MMOL/L),9916805: 7.1 (CALC)
GLOBULIN,GLOB: 2.7 G/DL (CALC) (ref 1.9–3.7)
GLUCOSE SERPL-MCNC: 103 MG/DL (ref 65–99)
HBA1C MFR BLD HPLC: 6.1 % OF TOTAL HGB
HDLC SERPL-MCNC: 63 MG/DL
LDL-CHOLESTEROL: 131 MG/DL (CALC)
NON-HDL CHOLESTEROL, 011976: 148 MG/DL (CALC)
POTASSIUM SERPL-SCNC: 5.4 MMOL/L (ref 3.5–5.3)
PROT SERPL-MCNC: 7.3 G/DL (ref 6.1–8.1)
PSA TOTAL,4108: 1.3 NG/ML
SODIUM SERPL-SCNC: 142 MMOL/L (ref 135–146)
TRIGL SERPL-MCNC: 74 MG/DL (ref ?–150)

## 2020-01-02 ENCOUNTER — OFFICE VISIT (OUTPATIENT)
Dept: FAMILY MEDICINE CLINIC | Age: 45
End: 2020-01-02

## 2020-01-02 VITALS
SYSTOLIC BLOOD PRESSURE: 130 MMHG | HEART RATE: 84 BPM | TEMPERATURE: 98.1 F | BODY MASS INDEX: 34.18 KG/M2 | WEIGHT: 230.8 LBS | DIASTOLIC BLOOD PRESSURE: 90 MMHG | RESPIRATION RATE: 18 BRPM | OXYGEN SATURATION: 96 % | HEIGHT: 69 IN

## 2020-01-02 DIAGNOSIS — E78.00 HYPERCHOLESTEREMIA: ICD-10-CM

## 2020-01-02 DIAGNOSIS — E66.09 CLASS 1 OBESITY DUE TO EXCESS CALORIES WITH SERIOUS COMORBIDITY AND BODY MASS INDEX (BMI) OF 34.0 TO 34.9 IN ADULT: ICD-10-CM

## 2020-01-02 DIAGNOSIS — R73.03 PREDIABETES: ICD-10-CM

## 2020-01-02 DIAGNOSIS — I10 ESSENTIAL HYPERTENSION: Primary | ICD-10-CM

## 2020-01-02 NOTE — PATIENT INSTRUCTIONS
High Blood Pressure: Care Instructions  Overview    It's normal for blood pressure to go up and down throughout the day. But if it stays up, you have high blood pressure. Another name for high blood pressure is hypertension. Despite what a lot of people think, high blood pressure usually doesn't cause headaches or make you feel dizzy or lightheaded. It usually has no symptoms. But it does increase your risk of stroke, heart attack, and other problems. You and your doctor will talk about your risks of these problems based on your blood pressure. Your doctor will give you a goal for your blood pressure. Your goal will be based on your health and your age. Lifestyle changes, such as eating healthy and being active, are always important to help lower blood pressure. You might also take medicine to reach your blood pressure goal.  Follow-up care is a key part of your treatment and safety. Be sure to make and go to all appointments, and call your doctor if you are having problems. It's also a good idea to know your test results and keep a list of the medicines you take. How can you care for yourself at home? Medical treatment  · If you stop taking your medicine, your blood pressure will go back up. You may take one or more types of medicine to lower your blood pressure. Be safe with medicines. Take your medicine exactly as prescribed. Call your doctor if you think you are having a problem with your medicine. · Talk to your doctor before you start taking aspirin every day. Aspirin can help certain people lower their risk of a heart attack or stroke. But taking aspirin isn't right for everyone, because it can cause serious bleeding. · See your doctor regularly. You may need to see the doctor more often at first or until your blood pressure comes down. · If you are taking blood pressure medicine, talk to your doctor before you take decongestants or anti-inflammatory medicine, such as ibuprofen.  Some of these medicines can raise blood pressure. · Learn how to check your blood pressure at home. Lifestyle changes  · Stay at a healthy weight. This is especially important if you put on weight around the waist. Losing even 10 pounds can help you lower your blood pressure. · If your doctor recommends it, get more exercise. Walking is a good choice. Bit by bit, increase the amount you walk every day. Try for at least 30 minutes on most days of the week. You also may want to swim, bike, or do other activities. · Avoid or limit alcohol. Talk to your doctor about whether you can drink any alcohol. · Try to limit how much sodium you eat to less than 2,300 milligrams (mg) a day. Your doctor may ask you to try to eat less than 1,500 mg a day. · Eat plenty of fruits (such as bananas and oranges), vegetables, legumes, whole grains, and low-fat dairy products. · Lower the amount of saturated fat in your diet. Saturated fat is found in animal products such as milk, cheese, and meat. Limiting these foods may help you lose weight and also lower your risk for heart disease. · Do not smoke. Smoking increases your risk for heart attack and stroke. If you need help quitting, talk to your doctor about stop-smoking programs and medicines. These can increase your chances of quitting for good. When should you call for help? Call  911 anytime you think you may need emergency care. This may mean having symptoms that suggest that your blood pressure is causing a serious heart or blood vessel problem. Your blood pressure may be over 180/120.   For example, call  911 if:    · You have symptoms of a heart attack. These may include:  ? Chest pain or pressure, or a strange feeling in the chest.  ? Sweating. ? Shortness of breath. ? Nausea or vomiting. ? Pain, pressure, or a strange feeling in the back, neck, jaw, or upper belly or in one or both shoulders or arms. ? Lightheadedness or sudden weakness.   ? A fast or irregular heartbeat.     · You have symptoms of a stroke. These may include:  ? Sudden numbness, tingling, weakness, or loss of movement in your face, arm, or leg, especially on only one side of your body. ? Sudden vision changes. ? Sudden trouble speaking. ? Sudden confusion or trouble understanding simple statements. ? Sudden problems with walking or balance. ? A sudden, severe headache that is different from past headaches.     · You have severe back or belly pain.    Do not wait until your blood pressure comes down on its own. Get help right away.   Call your doctor now or seek immediate care if:    · Your blood pressure is much higher than normal (such as 180/120 or higher), but you don't have symptoms.     · You think high blood pressure is causing symptoms, such as:  ? Severe headache.  ? Blurry vision.    Watch closely for changes in your health, and be sure to contact your doctor if:    · Your blood pressure measures higher than your doctor recommends at least 2 times. That means the top number is higher or the bottom number is higher, or both.     · You think you may be having side effects from your blood pressure medicine. Where can you learn more? Go to http://jack-angle.info/. Enter E873 in the search box to learn more about \"High Blood Pressure: Care Instructions. \"  Current as of: April 9, 2019  Content Version: 12.2  © 9329-9290 [employer]. Care instructions adapted under license by [employer] (which disclaims liability or warranty for this information). If you have questions about a medical condition or this instruction, always ask your healthcare professional. [place] [address] any warranty or liability for your use of this information.

## 2020-01-02 NOTE — PROGRESS NOTES
Patient is in the office today for a 6 month follow up. 1. Have you been to the ER, urgent care clinic since your last visit? Hospitalized since your last visit? No    2. Have you seen or consulted any other health care providers outside of the [address] [place] [address] since your last visit? Include any pap smears or colon screening.  No

## 2020-01-02 NOTE — PROGRESS NOTES
Subjective:       Chief Complaint  The patient presents for follow up of hypertension and high cholesterol. Obesity         HPI  Ivelisse Slaughter is a 45 y.o. male seen for follow up of hyperlipidemia. Healso has hypertension. Hypertension borderline controlled, pt has been trying to improve it with diet and weight loss and cutting back salt in his diet. , hyperlipidemia borderline controlled, pt will try and improve by losing 10 -20 lbs . Pt also has prediabetes which he will try to improve with losing 10-20 lbs. Diet and Lifestyle: generally follows a low fat low cholesterol diet, does not rigorously follow a diabetic diet, exercises sporadically    Home BP Monitoring: is not monotard at home. He will start from now on. Other symptoms and concerns: pt will continue to work on cutting back starches and calories to lose weight and decrease his BMI from 34        Current Outpatient Medications   Medication Sig    multivitamin (ONE A DAY) tablet Take 1 Tab by mouth daily.  aspirin/acetaminophen/caffeine (EXCEDRIN MIGRAINE PO) Take  by mouth.  AFLURIA QUAD [id number], PF, syrg injection inject 0.5 milliliter intramuscularly     No current facility-administered medications for this visit.               Review of Systems  Respiratory: negative for dyspnea on exertion  Cardiovascular: negative for chest pain    Objective:     Visit Vitals  /90   Pulse 84   Temp 98.1 °F (36.7 °C) (Oral)   Resp 18   Ht 5' 9\" (1.753 m)   Wt 230 lb 12.8 oz (104.7 kg)   SpO2 96%   BMI 34.08 kg/m²        General appearance - alert, well appearing, and in no distress  Chest - clear to auscultation, no wheezes, rales or rhonchi, symmetric air entry  Heart - normal rate, regular rhythm, normal S1, S2, no murmurs, rubs, clicks or gallops  Extremities - peripheral pulses normal, no pedal edema, no clubbing or cyanosis        Labs:   Lab Results   Component Value Date/Time    Cholesterol, total 211 (H) 12/19/2019 07:06 AM    HDL Cholesterol 63 12/19/2019 07:06 AM    LDL-CHOLESTEROL 131 (H) 12/19/2019 07:06 AM    LDL, calculated 124.8 (H) 12/26/2017 07:35 AM    Triglyceride 74 12/19/2019 07:06 AM    CHOL/HDL Ratio 3.4 12/26/2017 07:35 AM    Cholesterol/HDL ratio 3.3 12/19/2019 07:06 AM       Lab Results   Component Value Date/Time    Sodium 142 12/19/2019 07:06 AM    Potassium 5.4 (H) 12/19/2019 07:06 AM    Chloride 104 12/19/2019 07:06 AM    CO2 26 12/19/2019 07:06 AM    Anion gap 9 12/26/2017 07:35 AM    Glucose 103 (H) 12/19/2019 07:06 AM    BUN 19 12/19/2019 07:06 AM    Creatinine 1.06 12/19/2019 07:06 AM    BUN/Creatinine ratio NOT APPLICABLE 75/61/8125 15:69 AM    GFR est  12/19/2019 07:06 AM    GFR est non-AA 86 12/19/2019 07:06 AM    Calcium 9.7 12/19/2019 07:06 AM    Bilirubin, total 0.4 12/19/2019 07:06 AM    ALT (SGPT) 33 12/19/2019 07:06 AM    AST (SGOT) 26 12/19/2019 07:06 AM    Alk. phosphatase 55 12/19/2019 07:06 AM    Protein, total 7.3 12/19/2019 07:06 AM    Albumin 4.6 12/19/2019 07:06 AM    Globulin 2.7 12/19/2019 07:06 AM    A-G Ratio 1.2 12/26/2017 07:35 AM              Assessment / Plan     Hypertension borderline controlled, will try and improve further with diet and weight loss. Pt to monitor at home and if elevated at home would start BP meds  Hyperlipidemia borderline controlled, will try to improve with diet and weight loss. .      ICD-10-CM ICD-9-CM    1. Essential hypertension H24 894.7 METABOLIC PANEL, COMPREHENSIVE   2. Prediabetes R73.03 790.29 Pt will try to improve by cutting back starches and sweets. HEMOGLOBIN A1C W/O EAG   3. Hypercholesteremia E78.00 272.0 LIPID PANEL   4. Class 1 obesity due to excess calories with serious comorbidity and body mass index (BMI) of 34.0 to 34.9 in adult E66.09 278.00 Pt will work on it by doing intermittent fasting and trying to exercise more. Z68.34 V85.34                Low cholesterol diet, weight control and daily exercise discussed.      Follow-up and Dispositions    · Return in about 6 months (around 7/2/2020) for labs 1 week before. Reviewed plan of care. Patient has provided input and agrees with goals.

## 2022-03-19 PROBLEM — E66.09 CLASS 1 OBESITY DUE TO EXCESS CALORIES WITH SERIOUS COMORBIDITY AND BODY MASS INDEX (BMI) OF 34.0 TO 34.9 IN ADULT: Status: ACTIVE | Noted: 2018-07-03

## 2022-03-19 PROBLEM — E66.811 CLASS 1 OBESITY DUE TO EXCESS CALORIES WITH SERIOUS COMORBIDITY AND BODY MASS INDEX (BMI) OF 34.0 TO 34.9 IN ADULT: Status: ACTIVE | Noted: 2018-07-03

## 2022-05-14 DIAGNOSIS — Z00.00 PHYSICAL EXAM: ICD-10-CM

## 2022-11-01 ENCOUNTER — TELEPHONE (OUTPATIENT)
Dept: INTERNAL MEDICINE CLINIC | Age: 47
End: 2022-11-01

## 2022-11-01 NOTE — TELEPHONE ENCOUNTER
Patient not seen in 2 years will need appointment before lab can be ordered. Labs can be done same day as appointment.

## 2022-11-01 NOTE — TELEPHONE ENCOUNTER
----- Message from Mio [place] sent at 11/1/2022  5:01 PM EDT -----  Subject: Message to Provider    QUESTIONS  Information for Provider? Would like Labs prior appointment please advise   when order ready. ---------------------------------------------------------------------------  --------------  Brennon [place] [facility] [place]  [phone]; OK to leave message on voicemail  ---------------------------------------------------------------------------  --------------  SCRIPT ANSWERS  Relationship to Patient?  Self Good nutrition is important when healing from an illness. Follow any nutrition recommendations given to you during your hospital stay. Encourage fluids to drink. If you have any questions about your diet or nutrition, call your PCP-- or the main hospital #[phone] and ask for the pediatric dietitian.

## 2022-11-30 ENCOUNTER — APPOINTMENT (OUTPATIENT)
Dept: INTERNAL MEDICINE CLINIC | Age: 47
End: 2022-11-30

## 2022-12-02 ENCOUNTER — OFFICE VISIT (OUTPATIENT)
Dept: INTERNAL MEDICINE CLINIC | Age: 47
End: 2022-12-02
Payer: OTHER GOVERNMENT

## 2022-12-02 VITALS
DIASTOLIC BLOOD PRESSURE: 94 MMHG | OXYGEN SATURATION: 98 % | BODY MASS INDEX: 34.51 KG/M2 | TEMPERATURE: 98.1 F | HEIGHT: 69 IN | RESPIRATION RATE: 20 BRPM | HEART RATE: 81 BPM | WEIGHT: 233 LBS | SYSTOLIC BLOOD PRESSURE: 154 MMHG

## 2022-12-02 DIAGNOSIS — R73.03 PREDIABETES: ICD-10-CM

## 2022-12-02 DIAGNOSIS — I10 PRIMARY HYPERTENSION: ICD-10-CM

## 2022-12-02 DIAGNOSIS — Z12.11 COLON CANCER SCREENING: ICD-10-CM

## 2022-12-02 DIAGNOSIS — Z00.00 ROUTINE MEDICAL EXAM: Primary | ICD-10-CM

## 2022-12-02 PROCEDURE — 3074F SYST BP LT 130 MM HG: CPT | Performed by: INTERNAL MEDICINE

## 2022-12-02 PROCEDURE — 3078F DIAST BP <80 MM HG: CPT | Performed by: INTERNAL MEDICINE

## 2022-12-02 PROCEDURE — 99396 PREV VISIT EST AGE 40-64: CPT | Performed by: INTERNAL MEDICINE

## 2022-12-02 NOTE — PROGRESS NOTES
Subjective:   Cintia Sharp is a 41 y.o. male presenting for his annual checkup. ROS:  Feeling well. No dyspnea or chest pain on exertion. No abdominal pain, change in bowel habits, black or bloody stools. No urinary tract or prostatic symptoms. No neurological complaints. Specific concerns today: Patient has not been seen in a couple of years. His blood pressure remains elevated and he is currently going through personal family issues so he is drinking more alcohol which is probably contributing to his blood pressure being more elevated. He continues to struggle to lose weight with his BMI up to 34 and diet not optimal.  He also has a history of prediabetes which would improve with weight loss and diet. .  Patient may have some underlying mild sleep apnea. If not improving with weight loss will consider reevaluation. Patient Active Problem List   Diagnosis Code    Hypercholesteremia E78.00    Keloid scar L91.0    Headache(784.0) R51    Class 1 obesity due to excess calories with serious comorbidity and body mass index (BMI) of 34.0 to 34.9 in adult E66.09, Z68.34     Current Outpatient Medications   Medication Sig Dispense Refill    multivitamin (ONE A DAY) tablet Take 1 Tab by mouth daily. aspirin/acetaminophen/caffeine (EXCEDRIN MIGRAINE PO) Take  by mouth.  (Patient not taking: Reported on 12/2/2022)      AFLURIA QUAD [id number], PF, syrg injection inject 0.5 milliliter intramuscularly (Patient not taking: Reported on 12/2/2022)  0        Lab Results   Component Value Date/Time    WBC 5.0 11/30/2022 01:50 PM    HGB 15.8 11/30/2022 01:50 PM    HCT 46.5 11/30/2022 01:50 PM    PLATELET 161 29/14/5160 01:50 PM    MCV 85 11/30/2022 01:50 PM     Lab Results   Component Value Date/Time    Cholesterol, total 194 11/30/2022 01:50 PM    HDL Cholesterol 63 11/30/2022 01:50 PM    LDL-CHOLESTEROL 131 (H) 12/19/2019 07:06 AM    LDL, calculated 120 (H) 11/30/2022 01:50 PM    LDL, calculated 124.8 (H) 12/26/2017 07:35 AM    Triglyceride 60 11/30/2022 01:50 PM    CHOL/HDL Ratio 3.4 12/26/2017 07:35 AM    Cholesterol/HDL ratio 3.3 12/19/2019 07:06 AM     Lab Results   Component Value Date/Time    Prostate Specific Ag 1.6 11/30/2022 01:50 PM     Lab Results   Component Value Date/Time    Sodium 139 11/30/2022 01:50 PM    Potassium 4.3 11/30/2022 01:50 PM    Chloride 100 11/30/2022 01:50 PM    CO2 23 11/30/2022 01:50 PM    Anion gap 9 12/26/2017 07:35 AM    Glucose 84 11/30/2022 01:50 PM    BUN 12 11/30/2022 01:50 PM    Creatinine 0.96 11/30/2022 01:50 PM    BUN/Creatinine ratio 13 11/30/2022 01:50 PM    GFR est  12/19/2019 07:06 AM    GFR est non-AA 86 12/19/2019 07:06 AM    Calcium 9.6 11/30/2022 01:50 PM    Bilirubin, total 0.3 11/30/2022 01:50 PM    ALT (SGPT) 29 11/30/2022 01:50 PM    Alk. phosphatase 63 11/30/2022 01:50 PM    Protein, total 7.4 11/30/2022 01:50 PM    Albumin 4.8 11/30/2022 01:50 PM    Globulin 2.7 12/19/2019 07:06 AM    A-G Ratio 1.8 11/30/2022 01:50 PM         Objective:     Visit Vitals  BP (!) 154/94 (BP 1 Location: Right arm, BP Patient Position: Standing, BP Cuff Size: Adult)   Pulse 81   Temp 98.1 °F (36.7 °C) (Temporal)   Resp 20   Ht 5' 9\" (1.753 m)   Wt 233 lb (105.7 kg)   SpO2 98%   BMI 34.41 kg/m²     The patient appears well, alert, oriented x 3, in no distress. ENT normal.  Neck supple. No adenopathy or thyromegaly. JESSICA. Lungs are clear, good air entry, no wheezes, rhonchi or rales. S1 and S2 normal, no murmurs, regular rate and rhythm. Abdomen is soft without tenderness, guarding, mass or organomegaly. Extremities show no edema, normal peripheral pulses. Neurological is normal without focal findings. Assessment/Plan:   healthy adult male  lose weight, increase physical activity, call if any problems. ICD-10-CM ICD-9-CM    1. Routine medical exam  Z00.00 V70.0       2. Primary hypertension  I10 401.9 Uncontrolled if not improving will need to consider medications. Patient to monitor blood pressure at home and cut back alcohol use. 3. Prediabetes  R73.03 790.29 Will try and improve with cutting back starches and sugar as well as alcohol in his diet. 4. Colon cancer screening  Z12.11 V76.51 REFERRAL TO GASTROENTEROLOGY      . Follow-up and Dispositions    Return in about 4 weeks (around 12/30/2022) for BP check.

## 2022-12-02 NOTE — PROGRESS NOTES
Patient is in the office today for a physical.    1. \"Have you been to the ER, urgent care clinic since your last visit? Hospitalized since your last visit? \" No    2. \"Have you seen or consulted any other health care providers outside of the [address] [place] [address] since your last visit? \" No     3. For patients aged 39-70: Has the patient had a colonoscopy / FIT/ Cologuard? Yes - no Care Gap present      If the patient is female:    4. For patients aged 41-77: Has the patient had a mammogram within the past 2 years? NA - based on age or sex      11. For patients aged 21-65: Has the patient had a pap smear?  NA - based on age or sex

## 2023-01-06 ENCOUNTER — OFFICE VISIT (OUTPATIENT)
Dept: INTERNAL MEDICINE CLINIC | Age: 48
End: 2023-01-06
Payer: OTHER GOVERNMENT

## 2023-01-06 VITALS
WEIGHT: 223 LBS | BODY MASS INDEX: 33.03 KG/M2 | HEART RATE: 81 BPM | TEMPERATURE: 98.2 F | HEIGHT: 69 IN | OXYGEN SATURATION: 97 % | DIASTOLIC BLOOD PRESSURE: 89 MMHG | SYSTOLIC BLOOD PRESSURE: 148 MMHG | RESPIRATION RATE: 20 BRPM

## 2023-01-06 DIAGNOSIS — I10 PRIMARY HYPERTENSION: Primary | ICD-10-CM

## 2023-01-06 DIAGNOSIS — R73.03 PREDIABETES: ICD-10-CM

## 2023-01-06 PROCEDURE — 99213 OFFICE O/P EST LOW 20 MIN: CPT | Performed by: INTERNAL MEDICINE

## 2023-01-06 PROCEDURE — 3078F DIAST BP <80 MM HG: CPT | Performed by: INTERNAL MEDICINE

## 2023-01-06 PROCEDURE — 3074F SYST BP LT 130 MM HG: CPT | Performed by: INTERNAL MEDICINE

## 2023-01-06 NOTE — PROGRESS NOTES
Patient is in the office today for a 4 week follow up. 1. \"Have you been to the ER, urgent care clinic since your last visit? Hospitalized since your last visit? \" No    2. \"Have you seen or consulted any other health care providers outside of the [address] [place] [address] since your last visit? \" No     3. For patients aged 39-70: Has the patient had a colonoscopy / FIT/ Cologuard? Yes - no Care Gap present      If the patient is female:    4. For patients aged 41-77: Has the patient had a mammogram within the past 2 years? NA - based on age or sex      11. For patients aged 21-65: Has the patient had a pap smear?  NA - based on age or sex

## 2023-01-08 NOTE — PROGRESS NOTES
Mendel Caulk is a 41 y.o.  male and presents with Blood Pressure Check (Follow up) and Blood sugar problem      SUBJECTIVE:    Patient high blood pressure has improved with some weight loss but still remains uncontrolled. Patient would like a chance to control it with weight loss and exercise. Patient also has prediabetes related to his weight gain which he also wants to try and control with diet, exercise and weight loss. Respiratory ROS: negative for - shortness of breath  Cardiovascular ROS: negative for - chest pain    Current Outpatient Medications   Medication Sig    multivitamin (ONE A DAY) tablet Take 1 Tab by mouth daily. aspirin/acetaminophen/caffeine (EXCEDRIN MIGRAINE PO) Take  by mouth. (Patient not taking: No sig reported)    AFLURIA QUAD [id number], PF, syrg injection inject 0.5 milliliter intramuscularly (Patient not taking: No sig reported)     No current facility-administered medications for this visit. OBJECTIVE:  alert, well appearing, and in no distress  Visit Vitals  BP (!) 148/89 (BP 1 Location: Right arm, BP Patient Position: Sitting, BP Cuff Size: Adult)   Pulse 81   Temp 98.2 °F (36.8 °C) (Temporal)   Resp 20   Ht 5' 9\" (1.753 m)   Wt 223 lb (101.2 kg)   SpO2 97%   BMI 32.93 kg/m²      well developed and well nourished          Labs:   Lab Results   Component Value Date/Time    Cholesterol, total 194 11/30/2022 01:50 PM    HDL Cholesterol 63 11/30/2022 01:50 PM    LDL-CHOLESTEROL 131 (H) 12/19/2019 07:06 AM    LDL, calculated 120 (H) 11/30/2022 01:50 PM    LDL, calculated 124.8 (H) 12/26/2017 07:35 AM    Triglyceride 60 11/30/2022 01:50 PM    CHOL/HDL Ratio 3.4 12/26/2017 07:35 AM    Cholesterol/HDL ratio 3.3 12/19/2019 07:06 AM     Lab Results   Component Value Date/Time    Hemoglobin A1c 6.1 (H) 12/19/2019 07:06 AM        Discussed the patient's BMI with him.   The BMI follow up plan is as follows: I have counseled this patient on diet and exercise regimens. Assessment/Plan      ICD-10-CM ICD-9-CM    1. Primary hypertension  I10 401.9 Uncontrolled with just diet. If unable to to improve further with weight loss will need to start medications      2. Prediabetes  R73.03 790.29 Patient trying to improve this with diet and weight loss. Follow-up and Dispositions    Return in about 3 months (around 4/6/2023) for BP check, POC Hba1c. Reviewed plan of care. Patient has provided input and agrees with goals.

## 2023-05-23 ENCOUNTER — OFFICE VISIT (OUTPATIENT)
Age: 48
End: 2023-05-23
Payer: OTHER GOVERNMENT

## 2023-05-23 VITALS
OXYGEN SATURATION: 98 % | DIASTOLIC BLOOD PRESSURE: 89 MMHG | HEIGHT: 69 IN | WEIGHT: 222 LBS | SYSTOLIC BLOOD PRESSURE: 138 MMHG | HEART RATE: 79 BPM | RESPIRATION RATE: 16 BRPM | TEMPERATURE: 98.7 F | BODY MASS INDEX: 32.88 KG/M2

## 2023-05-23 DIAGNOSIS — I10 PRIMARY HYPERTENSION: Primary | ICD-10-CM

## 2023-05-23 DIAGNOSIS — R73.03 PREDIABETES: ICD-10-CM

## 2023-05-23 DIAGNOSIS — E78.5 DYSLIPIDEMIA: ICD-10-CM

## 2023-05-23 DIAGNOSIS — Z12.5 PROSTATE CANCER SCREENING: ICD-10-CM

## 2023-05-23 PROCEDURE — 3074F SYST BP LT 130 MM HG: CPT | Performed by: INTERNAL MEDICINE

## 2023-05-23 PROCEDURE — 3078F DIAST BP <80 MM HG: CPT | Performed by: INTERNAL MEDICINE

## 2023-05-23 PROCEDURE — 99213 OFFICE O/P EST LOW 20 MIN: CPT | Performed by: INTERNAL MEDICINE

## 2023-05-23 NOTE — PROGRESS NOTES
Aydee Nino presents today for   Chief Complaint   Patient presents with    Blood Pressure Check     6 week  follow up          1. \"Have you been to the ER, urgent care clinic since your last visit? Hospitalized since your last visit? \" no    2. \"Have you seen or consulted any other health care providers outside of the [address] [place] [address] since your last visit? \" no     3. For patients aged 39-70: Has the patient had a colonoscopy / FIT/ Cologuard? Yes - no Care Gap present      If the patient is female:    4. For patients aged 41-77: Has the patient had a mammogram within the past 2 years? NA - based on age or sex      11. For patients aged 21-65: Has the patient had a pap smear?  NA - based on age or sex

## 2023-05-28 ASSESSMENT — ENCOUNTER SYMPTOMS: ABDOMINAL PAIN: 0

## 2023-11-20 ENCOUNTER — HOSPITAL ENCOUNTER (OUTPATIENT)
Facility: HOSPITAL | Age: 48
Setting detail: SPECIMEN
Discharge: HOME | End: 2023-11-23
Payer: COMMERCIAL

## 2023-11-20 DIAGNOSIS — R73.03 PREDIABETES: ICD-10-CM

## 2023-11-20 DIAGNOSIS — E78.5 DYSLIPIDEMIA: ICD-10-CM

## 2023-11-20 DIAGNOSIS — I10 PRIMARY HYPERTENSION: ICD-10-CM

## 2023-11-20 DIAGNOSIS — Z12.5 PROSTATE CANCER SCREENING: ICD-10-CM

## 2023-11-20 LAB
ALBUMIN SERPL-MCNC: 4.2 G/DL (ref 3.4–5)
ALBUMIN/GLOB SERPL: 1.2 (ref 0.8–1.7)
ALP SERPL-CCNC: 52 U/L (ref 45–117)
ALT SERPL-CCNC: 30 U/L (ref 16–61)
ANION GAP SERPL CALC-SCNC: 4 MMOL/L (ref 3–18)
AST SERPL-CCNC: 21 U/L (ref 10–38)
BILIRUB SERPL-MCNC: 0.4 MG/DL (ref 0.2–1)
BUN SERPL-MCNC: 18 MG/DL (ref 7–18)
BUN/CREAT SERPL: 17 (ref 12–20)
CALCIUM SERPL-MCNC: 9.6 MG/DL (ref 8.5–10.1)
CHLORIDE SERPL-SCNC: 107 MMOL/L (ref 100–111)
CHOLEST SERPL-MCNC: 212 MG/DL
CO2 SERPL-SCNC: 29 MMOL/L (ref 21–32)
CREAT SERPL-MCNC: 1.08 MG/DL (ref 0.6–1.3)
CREAT UR-MCNC: 189 MG/DL (ref 30–125)
EST. AVERAGE GLUCOSE BLD GHB EST-MCNC: 120 MG/DL
GLOBULIN SER CALC-MCNC: 3.4 G/DL (ref 2–4)
GLUCOSE SERPL-MCNC: 99 MG/DL (ref 74–99)
HBA1C MFR BLD: 5.8 % (ref 4.2–5.6)
HDLC SERPL-MCNC: 78 MG/DL (ref 40–60)
HDLC SERPL: 2.7 (ref 0–5)
LDLC SERPL CALC-MCNC: 122.8 MG/DL (ref 0–100)
LIPID PANEL: ABNORMAL
MICROALBUMIN UR-MCNC: 3.31 MG/DL (ref 0–3)
MICROALBUMIN/CREAT UR-RTO: 18 MG/G (ref 0–30)
POTASSIUM SERPL-SCNC: 4.2 MMOL/L (ref 3.5–5.5)
PROT SERPL-MCNC: 7.6 G/DL (ref 6.4–8.2)
PSA SERPL-MCNC: 1.8 NG/ML (ref 0–4)
SODIUM SERPL-SCNC: 140 MMOL/L (ref 136–145)
TRIGL SERPL-MCNC: 56 MG/DL
VLDLC SERPL CALC-MCNC: 11.2 MG/DL

## 2023-11-20 PROCEDURE — G0103 PSA SCREENING: HCPCS

## 2023-11-20 PROCEDURE — 80061 LIPID PANEL: CPT

## 2023-11-20 PROCEDURE — 82570 ASSAY OF URINE CREATININE: CPT

## 2023-11-20 PROCEDURE — 83036 HEMOGLOBIN GLYCOSYLATED A1C: CPT

## 2023-11-20 PROCEDURE — 80053 COMPREHEN METABOLIC PANEL: CPT

## 2023-11-20 PROCEDURE — 36415 COLL VENOUS BLD VENIPUNCTURE: CPT

## 2023-11-20 PROCEDURE — 82043 UR ALBUMIN QUANTITATIVE: CPT

## 2023-11-27 ENCOUNTER — OFFICE VISIT (OUTPATIENT)
Age: 48
End: 2023-11-27
Payer: COMMERCIAL

## 2023-11-27 VITALS
HEIGHT: 69 IN | TEMPERATURE: 97.5 F | WEIGHT: 217 LBS | BODY MASS INDEX: 32.14 KG/M2 | DIASTOLIC BLOOD PRESSURE: 98 MMHG | RESPIRATION RATE: 20 BRPM | SYSTOLIC BLOOD PRESSURE: 153 MMHG | OXYGEN SATURATION: 96 % | HEART RATE: 97 BPM

## 2023-11-27 DIAGNOSIS — I10 PRIMARY HYPERTENSION: Primary | ICD-10-CM

## 2023-11-27 DIAGNOSIS — R73.03 PREDIABETES: ICD-10-CM

## 2023-11-27 DIAGNOSIS — Z12.11 COLON CANCER SCREENING: ICD-10-CM

## 2023-11-27 DIAGNOSIS — E78.5 DYSLIPIDEMIA: ICD-10-CM

## 2023-11-27 PROCEDURE — 1036F TOBACCO NON-USER: CPT | Performed by: INTERNAL MEDICINE

## 2023-11-27 PROCEDURE — 3080F DIAST BP >= 90 MM HG: CPT | Performed by: INTERNAL MEDICINE

## 2023-11-27 PROCEDURE — 3077F SYST BP >= 140 MM HG: CPT | Performed by: INTERNAL MEDICINE

## 2023-11-27 PROCEDURE — G8427 DOCREV CUR MEDS BY ELIG CLIN: HCPCS | Performed by: INTERNAL MEDICINE

## 2023-11-27 PROCEDURE — G8484 FLU IMMUNIZE NO ADMIN: HCPCS | Performed by: INTERNAL MEDICINE

## 2023-11-27 PROCEDURE — 99214 OFFICE O/P EST MOD 30 MIN: CPT | Performed by: INTERNAL MEDICINE

## 2023-11-27 PROCEDURE — G8417 CALC BMI ABV UP PARAM F/U: HCPCS | Performed by: INTERNAL MEDICINE

## 2023-11-27 ASSESSMENT — ENCOUNTER SYMPTOMS: ABDOMINAL PAIN: 0

## 2023-11-27 NOTE — PROGRESS NOTES
Mayda Collier (:  1975) is a 57 y.o. male,Established patient, here for evaluation of the following chief complaint(s):  Cholesterol Problem, Hypertension, and Blood Sugar Problem (6 month follow up )         ASSESSMENT/PLAN:      ICD-10-CM    1. Primary hypertension  I10 Uncontrolled. If not improving within the next 6 weeks would start on blood pressure medications. Patient to bring in list of blood pressure readings at next office visit as well as blood pressure cuff. 2. Dyslipidemia  E78.5 Patient will try and improve with diet and increasing resistance training      3. Prediabetes  R73.03 Patient again will try improve with diet and increasing resistance training      4. Colon cancer screening  Z12.11 Amb External Referral To Gastroenterology             Return in about 6 weeks (around 2024) for BP Check. Subjective   SUBJECTIVE/OBJECTIVE:  Patient's high blood pressure remains under poor control. He has tried to improve with cutting back alcohol and increasing exercise and losing weight in the past..  Patient may have component of whitecoat hypertension so he will go ahead and check his blood pressure daily for the next 6 weeks and if readings from home are elevated we will go ahead and start on blood pressure medication at that time. Pt is working on diet and exercise to try and improve his prediabetes. Pt's LDL is  mildly elevated. He will work on improving this also with diet and may benefit from a Heart Scan in the near future. Review of Systems   Gastrointestinal:  Negative for abdominal pain. Objective   Physical Exam  Constitutional:       Appearance: Normal appearance. Eyes:      Pupils: Pupils are equal, round, and reactive to light. Cardiovascular:      Rate and Rhythm: Normal rate and regular rhythm. Pulmonary:      Effort: Pulmonary effort is normal.      Breath sounds: Normal breath sounds. Abdominal:      General: Abdomen is flat.

## 2023-11-27 NOTE — PROGRESS NOTES
Raul Delgado presents today for   Chief Complaint   Patient presents with    Cholesterol Problem    Hypertension    Blood Sugar Problem     6 month follow up                  1. \"Have you been to the ER, urgent care clinic since your last visit? Hospitalized since your last visit? \" no    2. \"Have you seen or consulted any other health care providers outside of the [address] [place] [address] since your last visit? \" no     3. For patients aged 43-73: Has the patient had a colonoscopy / FIT/ Cologuard? Yes - no Care Gap present      If the patient is female:    4. For patients aged 43-66: Has the patient had a mammogram within the past 2 years? NA - based on age or sex      11. For patients aged 21-65: Has the patient had a pap smear?  NA - based on age or sex Statement Selected

## 2024-01-15 ENCOUNTER — OFFICE VISIT (OUTPATIENT)
Age: 49
End: 2024-01-15
Payer: COMMERCIAL

## 2024-01-15 VITALS
TEMPERATURE: 97 F | HEART RATE: 91 BPM | DIASTOLIC BLOOD PRESSURE: 66 MMHG | SYSTOLIC BLOOD PRESSURE: 124 MMHG | WEIGHT: 217 LBS | BODY MASS INDEX: 32.14 KG/M2 | HEIGHT: 69 IN | OXYGEN SATURATION: 98 % | RESPIRATION RATE: 16 BRPM

## 2024-01-15 DIAGNOSIS — R73.03 PREDIABETES: ICD-10-CM

## 2024-01-15 DIAGNOSIS — E78.5 DYSLIPIDEMIA: ICD-10-CM

## 2024-01-15 DIAGNOSIS — I10 PRIMARY HYPERTENSION: Primary | ICD-10-CM

## 2024-01-15 PROCEDURE — G8417 CALC BMI ABV UP PARAM F/U: HCPCS | Performed by: INTERNAL MEDICINE

## 2024-01-15 PROCEDURE — 3078F DIAST BP <80 MM HG: CPT | Performed by: INTERNAL MEDICINE

## 2024-01-15 PROCEDURE — 1036F TOBACCO NON-USER: CPT | Performed by: INTERNAL MEDICINE

## 2024-01-15 PROCEDURE — 3074F SYST BP LT 130 MM HG: CPT | Performed by: INTERNAL MEDICINE

## 2024-01-15 PROCEDURE — G8427 DOCREV CUR MEDS BY ELIG CLIN: HCPCS | Performed by: INTERNAL MEDICINE

## 2024-01-15 PROCEDURE — G8484 FLU IMMUNIZE NO ADMIN: HCPCS | Performed by: INTERNAL MEDICINE

## 2024-01-15 PROCEDURE — 99213 OFFICE O/P EST LOW 20 MIN: CPT | Performed by: INTERNAL MEDICINE

## 2024-01-15 ASSESSMENT — PATIENT HEALTH QUESTIONNAIRE - PHQ9
SUM OF ALL RESPONSES TO PHQ QUESTIONS 1-9: 0
SUM OF ALL RESPONSES TO PHQ9 QUESTIONS 1 & 2: 0
1. LITTLE INTEREST OR PLEASURE IN DOING THINGS: 0
2. FEELING DOWN, DEPRESSED OR HOPELESS: 0
DEPRESSION UNABLE TO ASSESS: PT REFUSES
SUM OF ALL RESPONSES TO PHQ QUESTIONS 1-9: 0

## 2024-01-15 NOTE — PROGRESS NOTES
Gurpreet Schmidt (:  1975) is a 48 y.o. male established patient, here for evaluation of the following chief complaint(s):  Follow-up         ASSESSMENT/PLAN:    ICD-10-CM    1. Primary hypertension  I10 Well-controlled at home.  Patient may have whitecoat hypertension .  He will continue to work on exercise and weight loss and will hold off on blood pressure medicines at this time.  Comprehensive Metabolic Panel      2. Dyslipidemia  E78.5 Borderline controlled.  Will try and improve with diet and weight loss lipid Panel      3. Prediabetes  R73.03 Will try and improve with exercise and weight loss.  Hemoglobin A1C             Return in about 4 months (around 5/15/2024) for labs 1 week before.         Subjective   SUBJECTIVE/OBJECTIVE:  Patient presents for follow-up of his high blood pressure.  Blood pressure readings at home were initially in the 150s and has decreased to the 120s with the readings that he brings in today.  Patient says that he is eating better and will try to start to exercise on a regular basis.  He will also aim to try and lose about 20 pounds to overall improve blood pressure as well as general health.  Since blood pressure readings at home controlled we will give patient another 3 months to work and diet and exercise and weight loss.  Patient also has prediabetes and dyslipidemia which should also improve with exercise and weight loss.    Respiratory ROS: negative for - shortness of breath  Cardiovascular ROS: negative for - chest pain       Objective   /66 Comment: fronm Home  pt has white coat HTN  Pulse 91   Temp 97 °F (36.1 °C) (Temporal)   Resp 16   Ht 1.753 m (5' 9\")   Wt 98.4 kg (217 lb)   SpO2 98%   BMI 32.05 kg/m²          Current Outpatient Medications   Medication Sig    Multiple Vitamin (MULTI VITAMIN MENS PO) Take by mouth    NONFORMULARY Take 1 tablet by mouth daily Super Beets     No current facility-administered medications for this visit.

## 2024-01-15 NOTE — PROGRESS NOTES
Gurpreet Caraballoman presents today for   Chief Complaint   Patient presents with    Follow-up                 1. \"Have you been to the ER, urgent care clinic since your last visit?  Hospitalized since your last visit?\" no    2. \"Have you seen or consulted any other health care providers outside of the [facility] since your last visit?\" no     3. For patients aged 45-75: Has the patient had a colonoscopy / FIT/ Cologuard? No      If the patient is female:    4. For patients aged 40-74: Has the patient had a mammogram within the past 2 years? NA - based on age or sex      5. For patients aged 21-65: Has the patient had a pap smear? NA - based on age or sex

## 2024-05-15 ENCOUNTER — HOSPITAL ENCOUNTER (OUTPATIENT)
Facility: HOSPITAL | Age: 49
Setting detail: SPECIMEN
Discharge: HOME | End: 2024-05-18

## 2024-05-15 LAB — LABCORP SPECIMEN COLLECTION: NORMAL

## 2024-05-15 PROCEDURE — 99001 SPECIMEN HANDLING PT-LAB: CPT

## 2024-05-16 LAB
ALBUMIN SERPL-MCNC: 4.9 G/DL (ref 4.1–5.1)
ALBUMIN/GLOB SERPL: 1.8 {RATIO} (ref 1.2–2.2)
ALP SERPL-CCNC: 56 IU/L (ref 44–121)
ALT SERPL-CCNC: 37 IU/L (ref 0–44)
AST SERPL-CCNC: 39 IU/L (ref 0–40)
BILIRUB SERPL-MCNC: 0.5 MG/DL (ref 0–1.2)
BUN SERPL-MCNC: 18 MG/DL (ref 6–24)
BUN/CREAT SERPL: 17 (ref 9–20)
CALCIUM SERPL-MCNC: 9.9 MG/DL (ref 8.7–10.2)
CHLORIDE SERPL-SCNC: 103 MMOL/L (ref 96–106)
CHOLEST SERPL-MCNC: 206 MG/DL (ref 100–199)
CO2 SERPL-SCNC: 16 MMOL/L (ref 20–29)
CREAT SERPL-MCNC: 1.08 MG/DL (ref 0.76–1.27)
EGFRCR SERPLBLD CKD-EPI 2021: 86 ML/MIN/1.73
GLOBULIN SER CALC-MCNC: 2.7 G/DL (ref 1.5–4.5)
GLUCOSE SERPL-MCNC: 85 MG/DL (ref 70–99)
HBA1C MFR BLD: 6.1 % (ref 4.8–5.6)
HDLC SERPL-MCNC: 73 MG/DL
LDLC SERPL CALC-MCNC: 122 MG/DL (ref 0–99)
POTASSIUM SERPL-SCNC: 5.1 MMOL/L (ref 3.5–5.2)
PROT SERPL-MCNC: 7.6 G/DL (ref 6–8.5)
SODIUM SERPL-SCNC: 142 MMOL/L (ref 134–144)
SPECIMEN STATUS REPORT: NORMAL
TRIGL SERPL-MCNC: 61 MG/DL (ref 0–149)
VLDLC SERPL CALC-MCNC: 11 MG/DL (ref 5–40)

## 2024-05-28 SDOH — ECONOMIC STABILITY: HOUSING INSECURITY
IN THE LAST 12 MONTHS, WAS THERE A TIME WHEN YOU DID NOT HAVE A STEADY PLACE TO SLEEP OR SLEPT IN A SHELTER (INCLUDING NOW)?: NO

## 2024-05-28 SDOH — ECONOMIC STABILITY: TRANSPORTATION INSECURITY
IN THE PAST 12 MONTHS, HAS LACK OF TRANSPORTATION KEPT YOU FROM MEETINGS, WORK, OR FROM GETTING THINGS NEEDED FOR DAILY LIVING?: NO

## 2024-05-28 SDOH — ECONOMIC STABILITY: FOOD INSECURITY: WITHIN THE PAST 12 MONTHS, YOU WORRIED THAT YOUR FOOD WOULD RUN OUT BEFORE YOU GOT MONEY TO BUY MORE.: NEVER TRUE

## 2024-05-28 SDOH — ECONOMIC STABILITY: INCOME INSECURITY: HOW HARD IS IT FOR YOU TO PAY FOR THE VERY BASICS LIKE FOOD, HOUSING, MEDICAL CARE, AND HEATING?: NOT HARD AT ALL

## 2024-05-28 SDOH — ECONOMIC STABILITY: FOOD INSECURITY: WITHIN THE PAST 12 MONTHS, THE FOOD YOU BOUGHT JUST DIDN'T LAST AND YOU DIDN'T HAVE MONEY TO GET MORE.: NEVER TRUE

## 2024-05-29 ENCOUNTER — OFFICE VISIT (OUTPATIENT)
Age: 49
End: 2024-05-29
Payer: COMMERCIAL

## 2024-05-29 VITALS
BODY MASS INDEX: 33.33 KG/M2 | WEIGHT: 225 LBS | SYSTOLIC BLOOD PRESSURE: 144 MMHG | HEIGHT: 69 IN | TEMPERATURE: 97.6 F | HEART RATE: 87 BPM | OXYGEN SATURATION: 99 % | DIASTOLIC BLOOD PRESSURE: 86 MMHG | RESPIRATION RATE: 20 BRPM

## 2024-05-29 DIAGNOSIS — R73.03 PREDIABETES: ICD-10-CM

## 2024-05-29 DIAGNOSIS — E78.5 DYSLIPIDEMIA: ICD-10-CM

## 2024-05-29 DIAGNOSIS — I10 PRIMARY HYPERTENSION: Primary | ICD-10-CM

## 2024-05-29 DIAGNOSIS — Z12.11 COLON CANCER SCREENING: ICD-10-CM

## 2024-05-29 DIAGNOSIS — Z12.5 PROSTATE CANCER SCREENING: ICD-10-CM

## 2024-05-29 PROCEDURE — 99214 OFFICE O/P EST MOD 30 MIN: CPT | Performed by: INTERNAL MEDICINE

## 2024-05-29 PROCEDURE — 3077F SYST BP >= 140 MM HG: CPT | Performed by: INTERNAL MEDICINE

## 2024-05-29 PROCEDURE — G8417 CALC BMI ABV UP PARAM F/U: HCPCS | Performed by: INTERNAL MEDICINE

## 2024-05-29 PROCEDURE — G8427 DOCREV CUR MEDS BY ELIG CLIN: HCPCS | Performed by: INTERNAL MEDICINE

## 2024-05-29 PROCEDURE — 3079F DIAST BP 80-89 MM HG: CPT | Performed by: INTERNAL MEDICINE

## 2024-05-29 PROCEDURE — 1036F TOBACCO NON-USER: CPT | Performed by: INTERNAL MEDICINE

## 2024-05-29 SDOH — ECONOMIC STABILITY: FOOD INSECURITY: WITHIN THE PAST 12 MONTHS, THE FOOD YOU BOUGHT JUST DIDN'T LAST AND YOU DIDN'T HAVE MONEY TO GET MORE.: NEVER TRUE

## 2024-05-29 SDOH — ECONOMIC STABILITY: FOOD INSECURITY: WITHIN THE PAST 12 MONTHS, YOU WORRIED THAT YOUR FOOD WOULD RUN OUT BEFORE YOU GOT MONEY TO BUY MORE.: NEVER TRUE

## 2024-05-29 SDOH — ECONOMIC STABILITY: HOUSING INSECURITY
IN THE LAST 12 MONTHS, WAS THERE A TIME WHEN YOU DID NOT HAVE A STEADY PLACE TO SLEEP OR SLEPT IN A SHELTER (INCLUDING NOW)?: YES

## 2024-05-29 SDOH — ECONOMIC STABILITY: INCOME INSECURITY: HOW HARD IS IT FOR YOU TO PAY FOR THE VERY BASICS LIKE FOOD, HOUSING, MEDICAL CARE, AND HEATING?: NOT HARD AT ALL

## 2024-05-29 NOTE — PROGRESS NOTES
Gurpreet Schmidt presents today for   Chief Complaint   Patient presents with    Cholesterol Problem    Hypertension    Blood Sugar Problem     4 month follow up            \"Have you been to the ER, urgent care clinic since your last visit?  Hospitalized since your last visit?\"    NO    “Have you seen or consulted any other health care providers outside of [facility] since your last visit?”    NO    “Have you had a colorectal cancer screening such as a colonoscopy/FIT/Cologuard?    NO    No colonoscopy on file  No cologuard on file  No FIT/FOBT on file   No flexible sigmoidoscopy on file

## 2024-05-29 NOTE — PROGRESS NOTES
Gurpreet Schmidt (:  1975) is a 48 y.o. male established patient, here for evaluation of the following chief complaint(s):  Cholesterol Problem, Hypertension, Blood Sugar Problem (4 month follow up ), and Referral - General (Colonoscopy )      Assessment & Plan   ASSESSMENT/PLAN:    ICD-10-CM    1. Primary hypertension  I10 Borderline control with diet.  Patient has significant whitecoat hypertension and will always bring in his blood pressure readings.  If blood pressure not better by next office visit would start on an ARB Comprehensive Metabolic Panel      2. Dyslipidemia  E78.5 Borderline control will try and improve with weight loss and exercise lipid Panel      3. Prediabetes  R73.03 Borderline controlled.  Will try and improve with weight loss and exercise.  Hemoglobin A1C      4. Colon cancer screening  Z12.11 External Referral To Gastroenterology             Return in about 6 months (around 2024) for labs 1 week before.         Subjective   SUBJECTIVE/OBJECTIVE:  Patient presents for follow-up of his high blood pressure.  Systolic blood pressure at home is in the 140s.  Will give him another 6 months to try and improve blood pressure with weight loss and exercise otherwise we will need to consider medications.  Patient also has prediabetes and dyslipidemia which should also improve with exercise and weight loss.      Respiratory ROS: negative for - shortness of breath  Cardiovascular ROS: negative for - chest pain       Objective   BP (!) 144/86 Comment: BP from Home pt has white coat HTN  Pulse 87   Temp 97.6 °F (36.4 °C) (Oral)   Resp 20   Ht 1.753 m (5' 9\")   Wt 102.1 kg (225 lb)   SpO2 99%   BMI 33.23 kg/m²   Chest - clear to auscultation, no wheezes, rales or rhonchi, symmetric air entry  Heart - normal rate, regular rhythm, normal S1, S2, no murmurs, rubs, clicks or gallops  Extremities - peripheral pulses normal, no pedal edema, no clubbing or cyanosis         Current

## 2024-11-18 ENCOUNTER — LAB (OUTPATIENT)
Facility: CLINIC | Age: 49
End: 2024-11-18

## 2024-11-18 DIAGNOSIS — I10 PRIMARY HYPERTENSION: ICD-10-CM

## 2024-11-18 DIAGNOSIS — E78.5 DYSLIPIDEMIA: ICD-10-CM

## 2024-11-18 DIAGNOSIS — R73.03 PREDIABETES: ICD-10-CM

## 2024-11-18 DIAGNOSIS — Z12.5 PROSTATE CANCER SCREENING: ICD-10-CM

## 2024-11-19 LAB
ALBUMIN SERPL-MCNC: 4.7 G/DL (ref 4.1–5.1)
ALP SERPL-CCNC: 53 IU/L (ref 44–121)
ALT SERPL-CCNC: 52 IU/L (ref 0–44)
AST SERPL-CCNC: 33 IU/L (ref 0–40)
BILIRUB SERPL-MCNC: 0.3 MG/DL (ref 0–1.2)
BUN SERPL-MCNC: 14 MG/DL (ref 6–24)
BUN/CREAT SERPL: 13 (ref 9–20)
CALCIUM SERPL-MCNC: 9.6 MG/DL (ref 8.7–10.2)
CHLORIDE SERPL-SCNC: 104 MMOL/L (ref 96–106)
CHOLEST SERPL-MCNC: 199 MG/DL (ref 100–199)
CO2 SERPL-SCNC: 21 MMOL/L (ref 20–29)
CREAT SERPL-MCNC: 1.08 MG/DL (ref 0.76–1.27)
EGFRCR SERPLBLD CKD-EPI 2021: 85 ML/MIN/1.73
GLOBULIN SER CALC-MCNC: 2.5 G/DL (ref 1.5–4.5)
GLUCOSE SERPL-MCNC: 101 MG/DL (ref 70–99)
HBA1C MFR BLD: 6.3 % (ref 4.8–5.6)
HDLC SERPL-MCNC: 65 MG/DL
LDLC SERPL CALC-MCNC: 122 MG/DL (ref 0–99)
POTASSIUM SERPL-SCNC: 4.4 MMOL/L (ref 3.5–5.2)
PROT SERPL-MCNC: 7.2 G/DL (ref 6–8.5)
PSA SERPL-MCNC: 2 NG/ML (ref 0–4)
SODIUM SERPL-SCNC: 141 MMOL/L (ref 134–144)
SPECIMEN STATUS REPORT: NORMAL
TRIGL SERPL-MCNC: 65 MG/DL (ref 0–149)
VLDLC SERPL CALC-MCNC: 12 MG/DL (ref 5–40)

## 2024-11-21 ENCOUNTER — ANESTHESIA EVENT (OUTPATIENT)
Facility: HOSPITAL | Age: 49
End: 2024-11-21
Payer: OTHER GOVERNMENT

## 2024-11-22 ENCOUNTER — HOSPITAL ENCOUNTER (OUTPATIENT)
Facility: HOSPITAL | Age: 49
Setting detail: OUTPATIENT SURGERY
Discharge: HOME | End: 2024-11-22
Attending: INTERNAL MEDICINE | Admitting: INTERNAL MEDICINE
Payer: OTHER GOVERNMENT

## 2024-11-22 ENCOUNTER — ANESTHESIA (OUTPATIENT)
Facility: HOSPITAL | Age: 49
End: 2024-11-22
Payer: OTHER GOVERNMENT

## 2024-11-22 VITALS
WEIGHT: 216 LBS | TEMPERATURE: 98.4 F | HEART RATE: 94 BPM | DIASTOLIC BLOOD PRESSURE: 88 MMHG | HEIGHT: 69 IN | BODY MASS INDEX: 31.99 KG/M2 | RESPIRATION RATE: 18 BRPM | OXYGEN SATURATION: 100 % | SYSTOLIC BLOOD PRESSURE: 112 MMHG

## 2024-11-22 PROCEDURE — 88305 TISSUE EXAM BY PATHOLOGIST: CPT

## 2024-11-22 PROCEDURE — 2580000003 HC RX 258: Performed by: ANESTHESIOLOGY

## 2024-11-22 PROCEDURE — 88342 IMHCHEM/IMCYTCHM 1ST ANTB: CPT

## 2024-11-22 PROCEDURE — 2709999900 HC NON-CHARGEABLE SUPPLY: Performed by: INTERNAL MEDICINE

## 2024-11-22 PROCEDURE — 7100000000 HC PACU RECOVERY - FIRST 15 MIN: Performed by: INTERNAL MEDICINE

## 2024-11-22 PROCEDURE — 3700000001 HC ADD 15 MINUTES (ANESTHESIA): Performed by: INTERNAL MEDICINE

## 2024-11-22 PROCEDURE — 2580000003 HC RX 258: Performed by: INTERNAL MEDICINE

## 2024-11-22 PROCEDURE — 3600007502: Performed by: INTERNAL MEDICINE

## 2024-11-22 PROCEDURE — 3600007512: Performed by: INTERNAL MEDICINE

## 2024-11-22 PROCEDURE — 2500000003 HC RX 250 WO HCPCS: Performed by: ANESTHESIOLOGY

## 2024-11-22 PROCEDURE — 88360 TUMOR IMMUNOHISTOCHEM/MANUAL: CPT

## 2024-11-22 PROCEDURE — 88341 IMHCHEM/IMCYTCHM EA ADD ANTB: CPT

## 2024-11-22 PROCEDURE — 6360000002 HC RX W HCPCS: Performed by: ANESTHESIOLOGY

## 2024-11-22 PROCEDURE — 7100000010 HC PHASE II RECOVERY - FIRST 15 MIN: Performed by: INTERNAL MEDICINE

## 2024-11-22 PROCEDURE — 3700000000 HC ANESTHESIA ATTENDED CARE: Performed by: INTERNAL MEDICINE

## 2024-11-22 RX ORDER — FAMOTIDINE 20 MG/1
20 TABLET, FILM COATED ORAL ONCE
Status: DISCONTINUED | OUTPATIENT
Start: 2024-11-22 | End: 2024-11-22 | Stop reason: HOSPADM

## 2024-11-22 RX ORDER — SODIUM CHLORIDE 9 MG/ML
INJECTION, SOLUTION INTRAVENOUS CONTINUOUS
Status: DISCONTINUED | OUTPATIENT
Start: 2024-11-22 | End: 2024-11-22 | Stop reason: HOSPADM

## 2024-11-22 RX ORDER — SODIUM CHLORIDE 0.9 % (FLUSH) 0.9 %
5-40 SYRINGE (ML) INJECTION PRN
Status: DISCONTINUED | OUTPATIENT
Start: 2024-11-22 | End: 2024-11-22 | Stop reason: HOSPADM

## 2024-11-22 RX ORDER — SODIUM CHLORIDE, SODIUM LACTATE, POTASSIUM CHLORIDE, CALCIUM CHLORIDE 600; 310; 30; 20 MG/100ML; MG/100ML; MG/100ML; MG/100ML
INJECTION, SOLUTION INTRAVENOUS CONTINUOUS
Status: DISCONTINUED | OUTPATIENT
Start: 2024-11-22 | End: 2024-11-22 | Stop reason: HOSPADM

## 2024-11-22 RX ORDER — PROPOFOL 10 MG/ML
INJECTION, EMULSION INTRAVENOUS
Status: DISCONTINUED | OUTPATIENT
Start: 2024-11-22 | End: 2024-11-22 | Stop reason: SDUPTHER

## 2024-11-22 RX ORDER — LIDOCAINE HYDROCHLORIDE 10 MG/ML
1 INJECTION, SOLUTION EPIDURAL; INFILTRATION; INTRACAUDAL; PERINEURAL
Status: DISCONTINUED | OUTPATIENT
Start: 2024-11-22 | End: 2024-11-22 | Stop reason: HOSPADM

## 2024-11-22 RX ORDER — LIDOCAINE HYDROCHLORIDE 20 MG/ML
INJECTION, SOLUTION EPIDURAL; INFILTRATION; INTRACAUDAL; PERINEURAL
Status: DISCONTINUED | OUTPATIENT
Start: 2024-11-22 | End: 2024-11-22 | Stop reason: SDUPTHER

## 2024-11-22 RX ADMIN — SODIUM CHLORIDE: 9 INJECTION, SOLUTION INTRAVENOUS at 15:06

## 2024-11-22 RX ADMIN — PROPOFOL 100 MG: 10 INJECTION, EMULSION INTRAVENOUS at 17:37

## 2024-11-22 RX ADMIN — PROPOFOL 25 MG: 10 INJECTION, EMULSION INTRAVENOUS at 17:42

## 2024-11-22 RX ADMIN — LIDOCAINE HYDROCHLORIDE 100 MG: 20 INJECTION, SOLUTION EPIDURAL; INFILTRATION; INTRACAUDAL; PERINEURAL at 17:37

## 2024-11-22 RX ADMIN — PROPOFOL 25 MG: 10 INJECTION, EMULSION INTRAVENOUS at 17:40

## 2024-11-22 RX ADMIN — PROPOFOL 25 MG: 10 INJECTION, EMULSION INTRAVENOUS at 17:38

## 2024-11-22 RX ADMIN — PROPOFOL 25 MG: 10 INJECTION, EMULSION INTRAVENOUS at 17:44

## 2024-11-22 RX ADMIN — PROPOFOL 25 MG: 10 INJECTION, EMULSION INTRAVENOUS at 17:46

## 2024-11-22 RX ADMIN — PROPOFOL 25 MG: 10 INJECTION, EMULSION INTRAVENOUS at 17:48

## 2024-11-22 RX ADMIN — PROPOFOL 50 MG: 10 INJECTION, EMULSION INTRAVENOUS at 17:57

## 2024-11-22 RX ADMIN — DEXMEDETOMIDINE 10 MCG: 200 INJECTION, SOLUTION INTRAVENOUS at 17:36

## 2024-11-22 ASSESSMENT — PAIN SCALES - GENERAL
PAINLEVEL_OUTOF10: 0
PAINLEVEL_OUTOF10: 0

## 2024-11-22 ASSESSMENT — PAIN - FUNCTIONAL ASSESSMENT
PAIN_FUNCTIONAL_ASSESSMENT: 0-10
PAIN_FUNCTIONAL_ASSESSMENT: NONE - DENIES PAIN

## 2024-11-22 NOTE — ANESTHESIA PRE PROCEDURE
98.2 °F (36.8 °C)   TempSrc:  Oral   SpO2:  98%   Weight: 104.3 kg (230 lb) 98 kg (216 lb)   Height: 1.753 m (5' 9\")                                               BP Readings from Last 3 Encounters:   11/22/24 (!) 156/99   05/29/24 (!) 144/86   01/15/24 124/66       NPO Status: Time of last liquid consumption: 0900                        Time of last solid consumption: 2100                        Date of last liquid consumption: 11/22/24                        Date of last solid food consumption: 11/20/24    BMI:   Wt Readings from Last 3 Encounters:   11/22/24 98 kg (216 lb)   05/29/24 102.1 kg (225 lb)   01/15/24 98.4 kg (217 lb)     Body mass index is 31.9 kg/m².    CBC:   Lab Results   Component Value Date/Time    WBC 5.0 11/30/2022 01:50 PM    RBC 5.48 11/30/2022 01:50 PM    HGB 15.8 11/30/2022 01:50 PM    HCT 46.5 11/30/2022 01:50 PM    MCV 85 11/30/2022 01:50 PM    RDW 14.0 11/30/2022 01:50 PM     11/30/2022 01:50 PM       CMP:   Lab Results   Component Value Date/Time     11/18/2024 12:00 AM    K 4.4 11/18/2024 12:00 AM     11/18/2024 12:00 AM    CO2 21 11/18/2024 12:00 AM    BUN 14 11/18/2024 12:00 AM    CREATININE 1.08 11/18/2024 12:00 AM    GFRAA 100 12/19/2019 07:06 AM    AGRATIO 1.8 05/15/2024 12:00 AM    LABGLOM 85 11/18/2024 12:00 AM    LABGLOM >60 11/20/2023 11:22 AM    LABGLOM 99 11/30/2022 01:50 PM    GLUCOSE 101 11/18/2024 12:00 AM    CALCIUM 9.6 11/18/2024 12:00 AM    BILITOT 0.3 11/18/2024 12:00 AM    ALKPHOS 53 11/18/2024 12:00 AM    AST 33 11/18/2024 12:00 AM    ALT 52 11/18/2024 12:00 AM       POC Tests: No results for input(s): \"POCGLU\", \"POCNA\", \"POCK\", \"POCCL\", \"POCBUN\", \"POCHEMO\", \"POCHCT\" in the last 72 hours.    Coags: No results found for: \"PROTIME\", \"INR\", \"APTT\"    HCG (If Applicable): No results found for: \"PREGTESTUR\", \"PREGSERUM\", \"HCG\", \"HCGQUANT\"     ABGs: No results found for: \"PHART\", \"PO2ART\", \"LTG2GTB\", \"ZYU0FVP\", \"BEART\", \"A0JBNDPE\"     Type & Screen (If

## 2024-11-22 NOTE — H&P
GASTROENTEROLOGY Pre-Procedure H and P      Impression/Plan:   1. This patient is consented for a colonoscopy for colon cancer screening.      Chief Complaint: colon cancer screening.      HPI:  Gurpreet Schmidt is a 49 y.o. male who presents for a colonoscopy for colon cancer screening.      PMH:   Past Medical History:   Diagnosis Date    Hypercholesterolemia        PSH:   Past Surgical History:   Procedure Laterality Date    WISDOM TOOTH EXTRACTION         Social HX:   Social History     Socioeconomic History    Marital status: Single     Spouse name: Not on file    Number of children: Not on file    Years of education: Not on file    Highest education level: Not on file   Occupational History    Not on file   Tobacco Use    Smoking status: Never     Passive exposure: Never    Smokeless tobacco: Never   Vaping Use    Vaping status: Never Used   Substance and Sexual Activity    Alcohol use: Yes     Comment: Social    Drug use: Yes     Types: Marijuana (Weed)    Sexual activity: Defer   Other Topics Concern    Not on file   Social History Narrative    Not on file     Social Determinants of Health     Financial Resource Strain: Low Risk  (5/29/2024)    Overall Financial Resource Strain (CARDIA)     Difficulty of Paying Living Expenses: Not hard at all   Food Insecurity: No Food Insecurity (5/29/2024)    Hunger Vital Sign     Worried About Running Out of Food in the Last Year: Never true     Ran Out of Food in the Last Year: Never true   Transportation Needs: Unknown (5/29/2024)    PRAPARE - Transportation     Lack of Transportation (Medical): Not on file     Lack of Transportation (Non-Medical): No   Physical Activity: Not on file   Stress: Not on file   Social Connections: Not on file   Intimate Partner Violence: Not on file   Housing Stability: High Risk (5/29/2024)    Housing Stability Vital Sign     Unable to Pay for Housing in the Last Year: Not on file     Number of Places Lived in the Last Year: Not

## 2024-11-22 NOTE — ANESTHESIA POSTPROCEDURE EVALUATION
Department of Anesthesiology  Postprocedure Note    Patient: Gurpreet Schmidt  MRN: [id number]  YOB: 1975  Date of evaluation: 11/22/2024    Procedure Summary       Date: 11/22/24 Room / Location: [facility] ENDO 02 / [facility] ENDOSCOPY    Anesthesia Start: 1734 Anesthesia Stop: 1806    Procedure: COLONOSCOPY DIAGNOSTIC w/ polypectomy (Abdomen) Diagnosis:       Obesity (BMI 30-39.9)      Colon cancer screening      Diarrhea, unspecified type      Heartburn      (Obesity (BMI 30-39.9) [E66.9])      (Colon cancer screening [Z12.11])      (Diarrhea, unspecified type [R19.7])      (Heartburn [R12])    Surgeons: Esequiel Murray MD Responsible Provider: Agustín Birmingham MD    Anesthesia Type: MAC ASA Status: 1            Anesthesia Type: MAC    Sarita Phase I: Sarita Score: 8    Sarita Phase II: Sarita Score: 10    Anesthesia Post Evaluation    Patient location during evaluation: bedside  Airway patency: patent  Cardiovascular status: hemodynamically stable  Respiratory status: acceptable  Hydration status: stable  Pain management: adequate    No notable events documented.

## 2024-11-22 NOTE — DISCHARGE INSTRUCTIONS
Colonoscopy: What to Expect at Home  Your Recovery  After a colonoscopy, you'll stay at the clinic until you wake up. Then you can go home. But you'll need to arrange for a ride. Your doctor will tell you when you can eat and do your other usual activities.  Your doctor will talk to you about when you'll need your next colonoscopy. Your doctor can help you decide how often you need to be checked. This will depend on the results of your test and your risk for colorectal cancer.  After the test, you may be bloated or have gas pains. You may need to pass gas. If a biopsy was done or a polyp was removed, you may have streaks of blood in your stool (feces) for a few days. Problems such as heavy rectal bleeding may not occur until several weeks after the test. This isn't common. But it can happen after polyps are removed.  This care sheet gives you a general idea about how long it will take for you to recover. But each person recovers at a different pace. Follow the steps below to get better as quickly as possible.  How can you care for yourself at home?  Activity    Rest when you feel tired.     You can do your normal activities when it feels okay to do so.   Diet    Follow your doctor's directions for eating.     Unless your doctor has told you not to, drink plenty of fluids. This helps to replace the fluids that were lost during the colon prep.     Do not drink alcohol.   Medicines    Your doctor will tell you if and when you can restart your medicines. You will also be given instructions about taking any new medicines.     If you take aspirin or some other blood thinner, ask your doctor if and when to start taking it again. Make sure that you understand exactly what your doctor wants you to do.     If polyps were removed or a biopsy was done during the test, your doctor may tell you not to take aspirin or other anti-inflammatory medicines for a few days. These include ibuprofen (Advil, Motrin) and naproxen (Aleve).  health.    Obesity, smoking, and a sedentary lifestyle greatly increase your risk for illness.  A healthy diet, regular physical exercise & weight monitoring are important for maintaining a healthy lifestyle  You may be retaining fluid if you have a history of heart failure or if you experience any of the following symptoms:  Weight gain of 3 pounds or more overnight or 5 pounds in a week, increased swelling in our hands or feet or shortness of breath while lying flat in bed.  Please call your doctor as soon as you notice any of these symptoms; do not wait until your next office visit.    Recognize signs and symptoms of STROKE:  F  -  Face looks uneven  A  -  Arms unable to move or move unevenly  S  -  Speech slurred or non-existent  T  -  Time to call 911 - as soon as signs and symptoms begin - DO NOT go back to bed or wait to see If you get better - TIME IS BRAIN.    Colorectal Screening  Colorectal cancer almost always develops from precancerous polyps (abnormal growths) in the colon or rectum.  Screening tests can find precancerous polyps, so that they can be removed before they turn into cancer. Screening tests can also find colorectal cancer early, when treatment works best.  Speak with your physician about when you should begin screening and how often you should be tested.              [employer] Activation    Thank you for requesting access to [employer]. Please follow the instructions below to securely access and download your online medical record. [employer] allows you to send messages to your doctor, view your test results, renew your prescriptions, schedule appointments, and more.    How Do I Sign Up?    In your internet browser, go to [email or web address].  Click on the First Time User? Click Here link in the Sign In box. You will see the New Member Sign Up page.  Enter your [employer] Access Code exactly as it appears below. You will not need to use this code after you’ve completed the sign-up

## 2024-11-25 ENCOUNTER — OFFICE VISIT (OUTPATIENT)
Facility: CLINIC | Age: 49
End: 2024-11-25
Payer: OTHER GOVERNMENT

## 2024-11-25 VITALS
WEIGHT: 224 LBS | SYSTOLIC BLOOD PRESSURE: 175 MMHG | RESPIRATION RATE: 16 BRPM | TEMPERATURE: 97.9 F | HEART RATE: 87 BPM | BODY MASS INDEX: 33.18 KG/M2 | DIASTOLIC BLOOD PRESSURE: 108 MMHG | OXYGEN SATURATION: 98 % | HEIGHT: 69 IN

## 2024-11-25 DIAGNOSIS — E78.5 DYSLIPIDEMIA: ICD-10-CM

## 2024-11-25 DIAGNOSIS — R73.03 PREDIABETES: ICD-10-CM

## 2024-11-25 DIAGNOSIS — I10 PRIMARY HYPERTENSION: Primary | ICD-10-CM

## 2024-11-25 PROCEDURE — 3080F DIAST BP >= 90 MM HG: CPT | Performed by: INTERNAL MEDICINE

## 2024-11-25 PROCEDURE — 3077F SYST BP >= 140 MM HG: CPT | Performed by: INTERNAL MEDICINE

## 2024-11-25 PROCEDURE — 99213 OFFICE O/P EST LOW 20 MIN: CPT | Performed by: INTERNAL MEDICINE

## 2024-11-25 RX ORDER — VALSARTAN AND HYDROCHLOROTHIAZIDE 160; 12.5 MG/1; MG/1
1 TABLET, FILM COATED ORAL DAILY
Qty: 30 TABLET | Refills: 3 | Status: SHIPPED | OUTPATIENT
Start: 2024-11-25

## 2024-11-25 NOTE — PROGRESS NOTES
Gurpreet Schmidt (:  1975) is a 49 y.o. male established patient, here for evaluation of the following chief complaint(s):  6 Month Follow-Up      Assessment & Plan   ASSESSMENT/PLAN:    ICD-10-CM    1. Primary hypertension  I10 Uncontrolled. Will start on valsartan-hydroCHLOROthiazide (DIOVAN HCT) 160-12.5 MG per tablet     Basic Metabolic Panel      2. Dyslipidemia  E78.5 Borderline controled. Pt will work on diet and weight loss as well as exercise to improve further.      3. Prediabetes  R73.03 Borderline controlled. Will try to improve with diet and weight loss              Return in about 6 weeks (around 2025) for labs 1 week before.         Subjective   SUBJECTIVE/OBJECTIVE:  Patient presents for follow-up of his high blood pressure.  Systolic blood pressure at home is in the 140s.  He has not been able to improve it with diet and weight loss at this time.  Patient also has prediabetes and dyslipidemia which should also improve with exercise and weight loss.      Respiratory ROS: negative for - shortness of breath  Cardiovascular ROS: negative for - chest pain       Objective   BP (!) 175/108   Pulse 87   Temp 97.9 °F (36.6 °C) (Temporal)   Resp 16   Ht 1.753 m (5' 9\")   Wt 101.6 kg (224 lb)   SpO2 98%   BMI 33.08 kg/m²   Chest - clear to auscultation, no wheezes, rales or rhonchi, symmetric air entry  Heart - normal rate, regular rhythm, normal S1, S2, no murmurs, rubs, clicks or gallops  Extremities - peripheral pulses normal, no pedal edema, no clubbing or cyanosis         Current Outpatient Medications   Medication Sig    valsartan-hydroCHLOROthiazide (DIOVAN HCT) 160-12.5 MG per tablet Take 1 tablet by mouth daily    Multiple Vitamin (MULTI VITAMIN MENS PO) Take by mouth     No current facility-administered medications for this visit.       HgBA1c:    Lab Results   Component Value Date/Time    LABA1C 6.3 2024 12:00 AM     CMP:    Lab Results   Component Value Date/Time    NA

## 2024-11-25 NOTE — PROGRESS NOTES
Gurpreet Schmidt presents today for   Chief Complaint   Patient presents with    6 Month Follow-Up       \"Have you been to the ER, urgent care clinic since your last visit?  Hospitalized since your last visit?\"    NO    “Have you seen or consulted any other health care providers outside of [facility] since your last visit?”    NO

## 2024-12-28 LAB
BUN SERPL-MCNC: 19 MG/DL (ref 6–24)
BUN/CREAT SERPL: 18 (ref 9–20)
CHLORIDE SERPL-SCNC: 101 MMOL/L (ref 96–106)
CO2 SERPL-SCNC: 23 MMOL/L (ref 20–29)
CREAT SERPL-MCNC: 1.04 MG/DL (ref 0.76–1.27)
EGFRCR SERPLBLD CKD-EPI 2021: 89 ML/MIN/1.73
GLUCOSE SERPL-MCNC: 107 MG/DL (ref 70–99)
POTASSIUM SERPL-SCNC: 4.8 MMOL/L (ref 3.5–5.2)
SODIUM SERPL-SCNC: 140 MMOL/L (ref 134–144)

## 2025-01-03 ENCOUNTER — OFFICE VISIT (OUTPATIENT)
Facility: CLINIC | Age: 50
End: 2025-01-03
Payer: OTHER GOVERNMENT

## 2025-01-03 VITALS
TEMPERATURE: 97.2 F | WEIGHT: 225 LBS | SYSTOLIC BLOOD PRESSURE: 148 MMHG | OXYGEN SATURATION: 95 % | BODY MASS INDEX: 33.33 KG/M2 | RESPIRATION RATE: 20 BRPM | HEIGHT: 69 IN | DIASTOLIC BLOOD PRESSURE: 85 MMHG | HEART RATE: 100 BPM

## 2025-01-03 DIAGNOSIS — R73.03 PREDIABETES: ICD-10-CM

## 2025-01-03 DIAGNOSIS — E78.5 DYSLIPIDEMIA: ICD-10-CM

## 2025-01-03 DIAGNOSIS — I10 PRIMARY HYPERTENSION: Primary | ICD-10-CM

## 2025-01-03 PROCEDURE — 3077F SYST BP >= 140 MM HG: CPT | Performed by: INTERNAL MEDICINE

## 2025-01-03 PROCEDURE — 3079F DIAST BP 80-89 MM HG: CPT | Performed by: INTERNAL MEDICINE

## 2025-01-03 PROCEDURE — 99213 OFFICE O/P EST LOW 20 MIN: CPT | Performed by: INTERNAL MEDICINE

## 2025-01-03 ASSESSMENT — PATIENT HEALTH QUESTIONNAIRE - PHQ9
DEPRESSION UNABLE TO ASSESS: FUNCTIONAL CAPACITY MOTIVATION LIMITS ACCURACY
SUM OF ALL RESPONSES TO PHQ QUESTIONS 1-9: 0
SUM OF ALL RESPONSES TO PHQ QUESTIONS 1-9: 0
2. FEELING DOWN, DEPRESSED OR HOPELESS: NOT AT ALL
SUM OF ALL RESPONSES TO PHQ QUESTIONS 1-9: 0
1. LITTLE INTEREST OR PLEASURE IN DOING THINGS: NOT AT ALL
SUM OF ALL RESPONSES TO PHQ QUESTIONS 1-9: 0
SUM OF ALL RESPONSES TO PHQ9 QUESTIONS 1 & 2: 0

## 2025-01-03 NOTE — PROGRESS NOTES
Gurpreet Schmidt (:  1975) is a 49 y.o. male established patient, here for evaluation of the following chief complaint(s):  Hypertension and Follow-up      Assessment & Plan   ASSESSMENT/PLAN:    ICD-10-CM    1. Primary hypertension  I10 Improving control but still not optimal on DiovanHct 160/12.5. if not better by next OV will maximize current medication. Comprehensive Metabolic Panel      2. Dyslipidemia  E78.5 Borderline controlled. Pt to work on diet and consider Heart Scan in the future. Lipid Panel      3. Prediabetes  R73.03 Borderline controlled. Will try to improve with diet and weight loss. Hemoglobin A1C             Return in about 4 months (around 5/3/2025) for labs 1 week before.         Subjective   SUBJECTIVE/OBJECTIVE:  Patient presents for follow-up of his high blood pressure. BP is better on DiovanHct 160/12.5 but still not optimal. He wants to work on diet and weight loss over the next 4 months but if BP is not optimal will maximize DiovanHct.   Patient also has prediabetes and dyslipidemia which should also improve with exercise and weight loss.      Respiratory ROS: negative for - shortness of breath  Cardiovascular ROS: negative for - chest pain       Objective   BP (!) 148/85 (Site: Right Upper Arm, Position: Sitting, Cuff Size: Large Adult)   Pulse 100   Temp 97.2 °F (36.2 °C) (Temporal)   Resp 20   Ht 1.753 m (5' 9\")   Wt 102.1 kg (225 lb)   SpO2 95%   BMI 33.23 kg/m²   Chest - clear to auscultation, no wheezes, rales or rhonchi, symmetric air entry  Heart - normal rate, regular rhythm, normal S1, S2, no murmurs, rubs, clicks or gallops  Extremities - peripheral pulses normal, no pedal edema, no clubbing or cyanosis         Current Outpatient Medications   Medication Sig    valsartan-hydroCHLOROthiazide (DIOVAN HCT) 160-12.5 MG per tablet Take 1 tablet by mouth daily    Multiple Vitamin (MULTI VITAMIN MENS PO) Take by mouth     No current facility-administered medications

## 2025-01-03 NOTE — PROGRESS NOTES
Gurpreet Schmidt presents today for   Chief Complaint   Patient presents with    Hypertension    Follow-up           \"Have you been to the ER, urgent care clinic since your last visit?  Hospitalized since your last visit?\"    NO    “Have you seen or consulted any other health care providers outside of [facility] since your last visit?”    NO

## 2025-04-01 DIAGNOSIS — I10 PRIMARY HYPERTENSION: ICD-10-CM

## 2025-04-01 RX ORDER — VALSARTAN AND HYDROCHLOROTHIAZIDE 160; 12.5 MG/1; MG/1
1 TABLET, FILM COATED ORAL DAILY
Qty: 90 TABLET | Refills: 3 | Status: SHIPPED | OUTPATIENT
Start: 2025-04-01

## 2025-04-01 NOTE — TELEPHONE ENCOUNTER
PCP: Adithya Penaloza MD    LAST OFFICE VISIT: 01/03/2025    LAST REFILL PER CHART:  Medication:valsartan-hydroCHLOROthiazide (DIOVAN HCT) 160-12.5 MG per tablet   Ordered On:11/25/2024  Instructions: Take 1 tablet by mouth daily   Dispense:30 tablets  Refills:3      Future Appointments   Date Time Provider Department Center   4/25/2025  9:00 AM [facility]OC LAB VISIT [facility] DEP   5/2/2025 11:00 AM Adithya Penaloza MD [facility] DEP

## 2025-04-25 ENCOUNTER — LAB (OUTPATIENT)
Facility: CLINIC | Age: 50
End: 2025-04-25

## 2025-04-25 DIAGNOSIS — E78.5 DYSLIPIDEMIA: ICD-10-CM

## 2025-04-25 DIAGNOSIS — I10 PRIMARY HYPERTENSION: ICD-10-CM

## 2025-04-25 DIAGNOSIS — R73.03 PREDIABETES: ICD-10-CM

## 2025-04-26 LAB
ALBUMIN SERPL-MCNC: 4.8 G/DL (ref 4.1–5.1)
ALP SERPL-CCNC: 59 IU/L (ref 44–121)
ALT SERPL-CCNC: 129 IU/L (ref 0–44)
AST SERPL-CCNC: 57 IU/L (ref 0–40)
BILIRUB SERPL-MCNC: 0.3 MG/DL (ref 0–1.2)
BUN SERPL-MCNC: 24 MG/DL (ref 6–24)
BUN/CREAT SERPL: 24 (ref 9–20)
CALCIUM SERPL-MCNC: 10 MG/DL (ref 8.7–10.2)
CHLORIDE SERPL-SCNC: 102 MMOL/L (ref 96–106)
CHOLEST SERPL-MCNC: 222 MG/DL (ref 100–199)
CO2 SERPL-SCNC: 21 MMOL/L (ref 20–29)
CREAT SERPL-MCNC: 1.02 MG/DL (ref 0.76–1.27)
EGFRCR SERPLBLD CKD-EPI 2021: 91 ML/MIN/1.73
GLOBULIN SER CALC-MCNC: 2.5 G/DL (ref 1.5–4.5)
GLUCOSE SERPL-MCNC: 84 MG/DL (ref 70–99)
HBA1C MFR BLD: 6.3 % (ref 4.8–5.6)
HDLC SERPL-MCNC: 67 MG/DL
LDLC SERPL CALC-MCNC: 142 MG/DL (ref 0–99)
POTASSIUM SERPL-SCNC: 4.5 MMOL/L (ref 3.5–5.2)
PROT SERPL-MCNC: 7.3 G/DL (ref 6–8.5)
SODIUM SERPL-SCNC: 142 MMOL/L (ref 134–144)
TRIGL SERPL-MCNC: 72 MG/DL (ref 0–149)
VLDLC SERPL CALC-MCNC: 13 MG/DL (ref 5–40)

## 2025-05-02 ENCOUNTER — OFFICE VISIT (OUTPATIENT)
Facility: CLINIC | Age: 50
End: 2025-05-02
Payer: OTHER GOVERNMENT

## 2025-05-02 VITALS
RESPIRATION RATE: 18 BRPM | BODY MASS INDEX: 34.36 KG/M2 | SYSTOLIC BLOOD PRESSURE: 137 MMHG | WEIGHT: 232 LBS | OXYGEN SATURATION: 95 % | HEART RATE: 92 BPM | HEIGHT: 69 IN | DIASTOLIC BLOOD PRESSURE: 82 MMHG | TEMPERATURE: 98 F

## 2025-05-02 DIAGNOSIS — E78.5 DYSLIPIDEMIA: ICD-10-CM

## 2025-05-02 DIAGNOSIS — Z12.5 PROSTATE CANCER SCREENING: ICD-10-CM

## 2025-05-02 DIAGNOSIS — R73.03 PREDIABETES: ICD-10-CM

## 2025-05-02 DIAGNOSIS — I10 PRIMARY HYPERTENSION: Primary | ICD-10-CM

## 2025-05-02 PROCEDURE — 3075F SYST BP GE 130 - 139MM HG: CPT | Performed by: INTERNAL MEDICINE

## 2025-05-02 PROCEDURE — 99213 OFFICE O/P EST LOW 20 MIN: CPT | Performed by: INTERNAL MEDICINE

## 2025-05-02 PROCEDURE — 3079F DIAST BP 80-89 MM HG: CPT | Performed by: INTERNAL MEDICINE

## 2025-05-02 RX ORDER — PANTOPRAZOLE SODIUM 40 MG/1
40 TABLET, DELAYED RELEASE ORAL DAILY
COMMUNITY

## 2025-05-02 SDOH — ECONOMIC STABILITY: INCOME INSECURITY: IN THE LAST 12 MONTHS, WAS THERE A TIME WHEN YOU WERE NOT ABLE TO PAY THE MORTGAGE OR RENT ON TIME?: NO

## 2025-05-02 SDOH — ECONOMIC STABILITY: FOOD INSECURITY: WITHIN THE PAST 12 MONTHS, YOU WORRIED THAT YOUR FOOD WOULD RUN OUT BEFORE YOU GOT MONEY TO BUY MORE.: NEVER TRUE

## 2025-05-02 SDOH — ECONOMIC STABILITY: FOOD INSECURITY: WITHIN THE PAST 12 MONTHS, THE FOOD YOU BOUGHT JUST DIDN'T LAST AND YOU DIDN'T HAVE MONEY TO GET MORE.: NEVER TRUE

## 2025-05-02 SDOH — ECONOMIC STABILITY: TRANSPORTATION INSECURITY
IN THE PAST 12 MONTHS, HAS THE LACK OF TRANSPORTATION KEPT YOU FROM MEDICAL APPOINTMENTS OR FROM GETTING MEDICATIONS?: NO

## 2025-05-02 NOTE — PROGRESS NOTES
Gurpreet Schmidt (:  1975) is a 49 y.o. male established patient, here for evaluation of the following chief complaint(s):  Hypertension, Cholesterol Problem, and Blood Sugar Problem (4 month follow up )      Assessment & Plan   ASSESSMENT/PLAN:    ICD-10-CM    1. Primary hypertension  I10 Borderline controlled on DiovanHct 160/12.5. if not optimal by next OV would increase dose Comprehensive Metabolic Panel      2. Dyslipidemia  E78.5 Borderline controlled. Will try to improve with diet and weight loss Lipid Panel      3. Prediabetes  R73.03 Borderline controlled. If not improving may need to consider medication Hemoglobin A1C      4. Prostate cancer screening  Z12.5 PSA Screening             Return in about 6 months (around 2025) for labs 1 week before.         Subjective   SUBJECTIVE/OBJECTIVE:  Patient presents for follow-up of his high blood pressure. BP is better on DiovanHct 160/12.5 but still not optimal. He wants to work on diet and weight loss over the next 6 months but if BP is not optimal will maximize DiovanHct.   Patient also has prediabetes and dyslipidemia which should also improve with exercise and weight loss.  If his Hba1c goes over 7 consider GLP-1.    Respiratory ROS: negative for - shortness of breath  Cardiovascular ROS: negative for - chest pain       Objective   /82 (BP Site: Right Upper Arm, Patient Position: Sitting, BP Cuff Size: Large Adult)   Pulse 92   Temp 98 °F (36.7 °C) (Temporal)   Resp 18   Ht 1.753 m (5' 9\")   Wt 105.2 kg (232 lb)   SpO2 95%   BMI 34.26 kg/m²   Chest - clear to auscultation, no wheezes, rales or rhonchi, symmetric air entry  Heart - normal rate, regular rhythm, normal S1, S2, no murmurs, rubs, clicks or gallops  Extremities - peripheral pulses normal, no pedal edema, no clubbing or cyanosis         Current Outpatient Medications   Medication Sig    VITAMIN D PO Take by mouth    pantoprazole (PROTONIX) 40 MG tablet Take 1 tablet by

## 2025-05-02 NOTE — PROGRESS NOTES
Gurpreet Shcmidt presents today for   Chief Complaint   Patient presents with    Hypertension    Cholesterol Problem    Blood Sugar Problem     4 month follow up            \"Have you been to the ER, urgent care clinic since your last visit?  Hospitalized since your last visit?\"    NO    “Have you seen or consulted any other health care providers outside of [facility] since your last visit?”    NO

## 2025-05-23 NOTE — PERIOP NOTE
Instructions for your surgery at [facility]      Today's Date:  5/23/2025      Patient's Name:  Gurpreet Schmidt           Surgery Date:  5/30/2025              Please enter the main entrance of the hospital and check-in at the front security desk located in the lobby. They will direct you to the area to report for your surgery.     Do NOT eat or drink anything, including candy, gum, or ice chips after midnight prior to your surgery, unless you have specific instructions from your surgeon or anesthesia provider to do so.  Brush your teeth before coming to the hospital. You may swish with water, but do not swallow.  No smoking/Vaping/E-Cigarettes 24 hours prior to the day of surgery.  No alcohol 24 hours prior to the day of surgery.  No recreational drugs for one week prior to the day of surgery.  Bring Photo ID, Insurance information, and Co-pay if required on day of surgery.  Bring in pertinent legal documents, such as, Medical Power of , DNR, Advance Directive, etc.  Leave all valuables, including money/purse, weapons at home.  Remove all jewelry, including ALL body piercings, nail polish, acrylic nails, and makeup (including mascara); no lotions, powders, deodorant, or perfume/cologne/after shave on the skin.  Follow instruction for Hibiclens washes and CHG wipes from surgeon's office.   Glasses and dentures may be worn to the hospital. They must be removed prior to surgery. Please bring case/container for glasses or dentures.   Contact lenses should not be worn on day of surgery.   Call your doctor's office if symptoms of a cold or illness develop within 24-48 hours prior to your surgery.  Call your doctor's office if you have any questions concerning insurance or co-pays.  15. AN ADULT (relative or friend 18 years or older) MUST DRIVE YOU HOME AFTER YOUR SURGERY.  16. Please make arrangements for a responsible adult (18 years or older) to be with you for 24 hours after your

## 2025-05-30 ENCOUNTER — HOSPITAL ENCOUNTER (OUTPATIENT)
Facility: HOSPITAL | Age: 50
Setting detail: OUTPATIENT SURGERY
Discharge: HOME | End: 2025-05-30
Attending: INTERNAL MEDICINE | Admitting: INTERNAL MEDICINE
Payer: OTHER GOVERNMENT

## 2025-05-30 VITALS
OXYGEN SATURATION: 100 % | DIASTOLIC BLOOD PRESSURE: 77 MMHG | TEMPERATURE: 98.2 F | SYSTOLIC BLOOD PRESSURE: 129 MMHG | HEART RATE: 81 BPM | BODY MASS INDEX: 32.88 KG/M2 | HEIGHT: 69 IN | WEIGHT: 222 LBS | RESPIRATION RATE: 21 BRPM

## 2025-05-30 LAB — COLONOSCOPY, EXTERNAL: NORMAL

## 2025-05-30 PROCEDURE — 7100000010 HC PHASE II RECOVERY - FIRST 15 MIN: Performed by: INTERNAL MEDICINE

## 2025-05-30 PROCEDURE — 2709999900 HC NON-CHARGEABLE SUPPLY: Performed by: INTERNAL MEDICINE

## 2025-05-30 PROCEDURE — 3600007502: Performed by: INTERNAL MEDICINE

## 2025-05-30 PROCEDURE — 7100000000 HC PACU RECOVERY - FIRST 15 MIN: Performed by: INTERNAL MEDICINE

## 2025-05-30 PROCEDURE — 3600007512: Performed by: INTERNAL MEDICINE

## 2025-05-30 PROCEDURE — 2580000003 HC RX 258: Performed by: NURSE ANESTHETIST, CERTIFIED REGISTERED

## 2025-05-30 RX ORDER — LIDOCAINE HYDROCHLORIDE 10 MG/ML
1 INJECTION, SOLUTION EPIDURAL; INFILTRATION; INTRACAUDAL; PERINEURAL
Status: DISCONTINUED | OUTPATIENT
Start: 2025-05-30 | End: 2025-05-30 | Stop reason: HOSPADM

## 2025-05-30 RX ORDER — SODIUM CHLORIDE, SODIUM LACTATE, POTASSIUM CHLORIDE, CALCIUM CHLORIDE 600; 310; 30; 20 MG/100ML; MG/100ML; MG/100ML; MG/100ML
INJECTION, SOLUTION INTRAVENOUS CONTINUOUS
Status: DISCONTINUED | OUTPATIENT
Start: 2025-05-30 | End: 2025-05-30 | Stop reason: HOSPADM

## 2025-05-30 RX ADMIN — SODIUM CHLORIDE, SODIUM LACTATE, POTASSIUM CHLORIDE, AND CALCIUM CHLORIDE: 600; 310; 30; 20 INJECTION, SOLUTION INTRAVENOUS at 13:04

## 2025-05-30 ASSESSMENT — PAIN - FUNCTIONAL ASSESSMENT
PAIN_FUNCTIONAL_ASSESSMENT: NONE - DENIES PAIN
PAIN_FUNCTIONAL_ASSESSMENT: 0-10
PAIN_FUNCTIONAL_ASSESSMENT: NONE - DENIES PAIN
PAIN_FUNCTIONAL_ASSESSMENT: 0-10
PAIN_FUNCTIONAL_ASSESSMENT: NONE - DENIES PAIN
PAIN_FUNCTIONAL_ASSESSMENT: NONE - DENIES PAIN

## 2025-05-30 NOTE — H&P
GASTROENTEROLOGY Pre-Procedure H and P      Impression/Plan:   1. This patient is consented for a colonoscopy for a flexible sigmoidoscopy for follow up rectal neuroendocrine tumor.      Chief Complaint:  follow up rectal neuroendocrine tumor.      HPI:  Gurpreet Schmidt is a 49 y.o. male who presents for a flexible sigmoidoscopy for follow up rectal neuroendocrine tumor.    PMH:   Past Medical History:   Diagnosis Date    Hypercholesterolemia     Hypertension     Prediabetes        PSH:   Past Surgical History:   Procedure Laterality Date    COLONOSCOPY N/A 11/22/2024    COLONOSCOPY DIAGNOSTIC w/ polypectomy performed by Esequiel Murray MD at [facility] ENDOSCOPY    WISDOM TOOTH EXTRACTION         Social HX:   Social History     Socioeconomic History    Marital status: Single     Spouse name: Not on file    Number of children: Not on file    Years of education: Not on file    Highest education level: Not on file   Occupational History    Not on file   Tobacco Use    Smoking status: Never     Passive exposure: Never    Smokeless tobacco: Never   Vaping Use    Vaping status: Never Used   Substance and Sexual Activity    Alcohol use: Yes     Comment: Social    Drug use: Not Currently     Types: Marijuana (Weed)    Sexual activity: Defer   Other Topics Concern    Not on file   Social History Narrative    Not on file     Social Drivers of Health     Financial Resource Strain: Low Risk  (5/29/2024)    Overall Financial Resource Strain (CARDIA)     Difficulty of Paying Living Expenses: Not hard at all   Food Insecurity: No Food Insecurity (5/2/2025)    Hunger Vital Sign     Worried About Running Out of Food in the Last Year: Never true     Ran Out of Food in the Last Year: Never true   Transportation Needs: No Transportation Needs (5/2/2025)    PRAPARE - Transportation     Lack of Transportation (Medical): No     Lack of Transportation (Non-Medical): No   Physical Activity: Not on file   Stress: Not on file   Social

## (undated) DEVICE — CANNULA NSL AD TBNG L14FT STD PVC O2 CRV CONN NONFLARED NSL

## (undated) DEVICE — SYRINGE MED 10ML LUERLOCK TIP W/O SFTY DISP

## (undated) DEVICE — SYRINGE MED 25GA 3ML L5/8IN SUBQ PLAS W/ DETACH NDL SFTY

## (undated) DEVICE — SNARE VASC L240CM LOOP W10MM SHTH DIA2.4MM RND STIFF CLD

## (undated) DEVICE — FORCEPS BX L240CM JAW DIA2.8MM L CAP W/ NDL MIC MESH TOOTH

## (undated) DEVICE — CATHETER SUCT TR FL TIP 14FR W/ O CTRL

## (undated) DEVICE — GOWN PLASTIC FILM THMBHKS UNIV BLUE: Brand: CARDINAL HEALTH

## (undated) DEVICE — SOLUTION IRRIG 1000ML H2O STRL BLT

## (undated) DEVICE — GAUZE,SPONGE,4"X4",16PLY,STRL,LF,10/TRAY: Brand: MEDLINE

## (undated) DEVICE — ENDOSCOPY PUMP TUBING/ CAP SET: Brand: ERBE

## (undated) DEVICE — SYRINGE MED 50ML LUERSLIP TIP

## (undated) DEVICE — YANKAUER,SMOOTH HANDLE,HIGH CAPACITY: Brand: MEDLINE INDUSTRIES, INC.

## (undated) DEVICE — TRAP SPEC POLYP REM STRNR CLN DSGN MAGNIFYING WIND DISP

## (undated) DEVICE — MEDI-VAC NON-CONDUCTIVE SUCTION TUBING: Brand: CARDINAL HEALTH

## (undated) DEVICE — CANNULA ORIG TL CLR W FOAM CUSHIONS AND 14FT SUPL TB 3 CHN

## (undated) DEVICE — SYRINGE 20ML LL S/C 50

## (undated) DEVICE — SNARE POLYP M W27MMXL240CM OVL STIFF DISP CAPTIVATOR

## (undated) DEVICE — UNDERPAD INCONT W23XL36IN STD BLU POLYPR BK FLUF SFT

## (undated) DEVICE — LINER SUCT CANSTR 3000CC PLAS SFT PRE ASSEMB W/OUT TBNG W/